# Patient Record
Sex: FEMALE | Race: ASIAN | URBAN - METROPOLITAN AREA
[De-identification: names, ages, dates, MRNs, and addresses within clinical notes are randomized per-mention and may not be internally consistent; named-entity substitution may affect disease eponyms.]

---

## 2017-01-04 DIAGNOSIS — I10 ESSENTIAL HYPERTENSION, BENIGN: Primary | ICD-10-CM

## 2017-01-04 RX ORDER — NEBIVOLOL 2.5 MG/1
2.5 TABLET ORAL DAILY
Qty: 90 TABLET | Refills: 3 | Status: SHIPPED | OUTPATIENT
Start: 2017-01-04 | End: 2018-01-24

## 2017-01-04 RX ORDER — TELMISARTAN AND AMLODIPINE 5; 40 MG/1; MG/1
1 TABLET ORAL DAILY
Qty: 90 TABLET | Refills: 3 | Status: SHIPPED | OUTPATIENT
Start: 2017-01-04 | End: 2018-01-24

## 2017-01-09 ENCOUNTER — OFFICE VISIT (OUTPATIENT)
Dept: CARDIOLOGY | Facility: CLINIC | Age: 72
End: 2017-01-09
Attending: INTERNAL MEDICINE
Payer: COMMERCIAL

## 2017-01-09 VITALS
WEIGHT: 112 LBS | HEART RATE: 78 BPM | SYSTOLIC BLOOD PRESSURE: 124 MMHG | HEIGHT: 60 IN | DIASTOLIC BLOOD PRESSURE: 68 MMHG | BODY MASS INDEX: 21.99 KG/M2

## 2017-01-09 DIAGNOSIS — I25.83 CORONARY ATHEROSCLEROSIS DUE TO LIPID RICH PLAQUE: ICD-10-CM

## 2017-01-09 DIAGNOSIS — E78.2 MIXED HYPERLIPIDEMIA: Primary | ICD-10-CM

## 2017-01-09 PROCEDURE — 99213 OFFICE O/P EST LOW 20 MIN: CPT | Performed by: INTERNAL MEDICINE

## 2017-01-09 RX ORDER — ISOSORBIDE MONONITRATE 30 MG/1
15 TABLET, EXTENDED RELEASE ORAL DAILY
Qty: 45 TABLET | Refills: 3 | Status: SHIPPED | OUTPATIENT
Start: 2017-01-09 | End: 2018-01-24

## 2017-01-09 RX ORDER — ROSUVASTATIN CALCIUM 20 MG/1
20 TABLET, COATED ORAL DAILY
Qty: 90 TABLET | Refills: 3 | Status: SHIPPED | OUTPATIENT
Start: 2017-01-09 | End: 2018-01-24

## 2017-01-09 NOTE — Clinical Note
"1/9/2017    Tracey Bell MD  [facility]   [address] [facility] [address]    RE: Afl Hargrove       Dear Colleague,    I had the pleasure of seeing Alf Hargrove in the [facility].    Cardiology Progress Note          Assessment and Plan:     1. Coronary artery disease status post PCI in [place]    Angina stable on a small dose of Imdur    Continue statin    Routine follow-up in one year      This note was transcribed using electronic voice recognition software and there may be typographical errors present.                Interval History:   The patient is a very pleasant 71 year old Mongolian woman whom I have been following for coronary artery disease, the symptoms have improved quite a bit on a very small dose of Imdur. She denies any exertional chest discomfort or dyspnea on exertion.  She feels very well.  She has aortic sclerosis without significant stenosis, her murmur is unchanged and still remains early peaking.                     Review of Systems:   Review of Systems:  Skin:  not assessed     Eyes:  Negative    ENT:  Negative    Respiratory:  Positive for cough  Cardiovascular:  Negative    Gastroenterology: Negative    Genitourinary:  not assessed    Musculoskeletal:  Negative    Neurologic:  Negative    Psychiatric:  Negative    Heme/Lymph/Imm:  Negative    Endocrine:  Negative                Physical Exam:     Vitals: /68 mmHg  Pulse 78  Ht 1.511 m (4' 11.5\")  Wt 50.803 kg (112 lb)  BMI 22.25 kg/m2  Constitutional:  cooperative, alert and oriented, well developed, well nourished, in no acute distress        Skin:  warm and dry to the touch, no apparent skin lesions or masses noted        Head:  normocephalic, no masses or lesions        Eyes:  pupils equal and round, conjunctivae and lids unremarkable, sclera white, no xanthalasma, EOMS intact, no nystagmus        ENT:  no pallor or cyanosis        Neck:  JVP normal        Chest:  normal " breath sounds, clear to auscultation, normal A-P diameter, normal symmetry, normal respiratory excursion, no use of accessory muscles        Cardiac:         early systolic murmur;RUSB;grade 2          Abdomen:      benign    Vascular:                                        Extremities and Back:  no deformities, clubbing, cyanosis, erythema observed;no edema        Neurological:  affect appropriate, oriented to time, person and place;no gross motor deficits                 Medications:     Current Outpatient Prescriptions   Medication Sig Dispense Refill     isosorbide mononitrate (IMDUR) 30 MG 24 hr tablet Take 0.5 tablets (15 mg) by mouth daily 45 tablet 3     rosuvastatin (CRESTOR) 20 MG tablet Take 1 tablet (20 mg) by mouth daily 90 tablet 3     Telmisartan-Amlodipine (TWYNSTA) 40-5 MG TABS Take 1 tablet by mouth daily 90 tablet 3     nebivolol (BYSTOLIC) 2.5 MG tablet Take 1 tablet (2.5 mg) by mouth daily 90 tablet 3     Cholecalciferol (VITAMIN D3 PO) Take 5,000 Units by mouth every other day       VITAMIN E COMPLEX PO Take 400 Units by mouth daily       nitroglycerin (NITROSTAT) 0.4 MG SL tablet Place one tab under the tongue for chest pain, may repeat every 5 minutes x2 25 tablet 3     aspirin 81 MG tablet Take 1 tablet (81 mg) by mouth daily 30 tablet      [DISCONTINUED] isosorbide mononitrate (IMDUR) 30 MG 24 hr tablet Take 0.5 tablets (15 mg) by mouth daily 15 tablet 12     [DISCONTINUED] rosuvastatin (CRESTOR) 20 MG tablet Take 1 tablet (20 mg) by mouth daily 30 tablet 12                Data:   All laboratory data reviewed  No results found for this or any previous visit (from the past 24 hour(s)).    All laboratory data reviewed  CHOL      178   7/11/2016  HDL       54   7/11/2016  LDL       79   7/11/2016  TRIG      223   7/11/2016  CHOLHDLRATIO      3.5   11/26/2014  TSH   Date Value Ref Range Status   04/12/2016 2.41 0.40 - 4.00 mU/L Final     Last Basic Metabolic Panel:  NA      142   4/12/2016    POTASSIUM      4.0   4/12/2016  CHLORIDE      107   4/12/2016  RICO      9.0   4/12/2016  CO2       25   4/12/2016  BUN       12   4/12/2016  BUN   NOT APPLICABLE   9/13/2008  CR     0.61   4/12/2016  GLC      109   4/12/2016  GLC       96   9/13/2008  WBC      6.7   4/12/2016  RBC     4.31   4/12/2016  HGB     13.1   4/12/2016  HCT     40.2   4/12/2016  MCV       93   4/12/2016  MCH     30.4   4/12/2016  MCHC     32.6   4/12/2016  RDW     12.4   4/12/2016  PLT      267   4/12/2016  PLT      395   3/27/2007    Thank you for allowing me to participate in the care of your patient.    Sincerely,     Rad Malcolm MD     [facility]

## 2017-01-09 NOTE — PROGRESS NOTES
"Cardiology Progress Note          Assessment and Plan:     1. Coronary artery disease status post PCI in [place]    Angina stable on a small dose of Imdur    Continue statin    Routine follow-up in one year      This note was transcribed using electronic voice recognition software and there may be typographical errors present.                Interval History:   The patient is a very pleasant 71 year old Hebrew woman whom I have been following for coronary artery disease, the symptoms have improved quite a bit on a very small dose of Imdur. She denies any exertional chest discomfort or dyspnea on exertion.  She feels very well.  She has aortic sclerosis without significant stenosis, her murmur is unchanged and still remains early peaking.                     Review of Systems:   Review of Systems:  Skin:  not assessed     Eyes:  Negative    ENT:  Negative    Respiratory:  Positive for cough  Cardiovascular:  Negative    Gastroenterology: Negative    Genitourinary:  not assessed    Musculoskeletal:  Negative    Neurologic:  Negative    Psychiatric:  Negative    Heme/Lymph/Imm:  Negative    Endocrine:  Negative                Physical Exam:     Vitals: /68 mmHg  Pulse 78  Ht 1.511 m (4' 11.5\")  Wt 50.803 kg (112 lb)  BMI 22.25 kg/m2  Constitutional:  cooperative, alert and oriented, well developed, well nourished, in no acute distress        Skin:  warm and dry to the touch, no apparent skin lesions or masses noted        Head:  normocephalic, no masses or lesions        Eyes:  pupils equal and round, conjunctivae and lids unremarkable, sclera white, no xanthalasma, EOMS intact, no nystagmus        ENT:  no pallor or cyanosis        Neck:  JVP normal        Chest:  normal breath sounds, clear to auscultation, normal A-P diameter, normal symmetry, normal respiratory excursion, no use of accessory muscles        Cardiac:         early systolic murmur;RUSB;grade 2          Abdomen:      benign    Vascular:         "                                Extremities and Back:  no deformities, clubbing, cyanosis, erythema observed;no edema        Neurological:  affect appropriate, oriented to time, person and place;no gross motor deficits                 Medications:     Current Outpatient Prescriptions   Medication Sig Dispense Refill     isosorbide mononitrate (IMDUR) 30 MG 24 hr tablet Take 0.5 tablets (15 mg) by mouth daily 45 tablet 3     rosuvastatin (CRESTOR) 20 MG tablet Take 1 tablet (20 mg) by mouth daily 90 tablet 3     Telmisartan-Amlodipine (TWYNSTA) 40-5 MG TABS Take 1 tablet by mouth daily 90 tablet 3     nebivolol (BYSTOLIC) 2.5 MG tablet Take 1 tablet (2.5 mg) by mouth daily 90 tablet 3     Cholecalciferol (VITAMIN D3 PO) Take 5,000 Units by mouth every other day       VITAMIN E COMPLEX PO Take 400 Units by mouth daily       nitroglycerin (NITROSTAT) 0.4 MG SL tablet Place one tab under the tongue for chest pain, may repeat every 5 minutes x2 25 tablet 3     aspirin 81 MG tablet Take 1 tablet (81 mg) by mouth daily 30 tablet      [DISCONTINUED] isosorbide mononitrate (IMDUR) 30 MG 24 hr tablet Take 0.5 tablets (15 mg) by mouth daily 15 tablet 12     [DISCONTINUED] rosuvastatin (CRESTOR) 20 MG tablet Take 1 tablet (20 mg) by mouth daily 30 tablet 12                Data:   All laboratory data reviewed  No results found for this or any previous visit (from the past 24 hour(s)).    All laboratory data reviewed  CHOL      178   7/11/2016  HDL       54   7/11/2016  LDL       79   7/11/2016  TRIG      223   7/11/2016  CHOLHDLRATIO      3.5   11/26/2014  TSH   Date Value Ref Range Status   04/12/2016 2.41 0.40 - 4.00 mU/L Final     Last Basic Metabolic Panel:  NA      142   4/12/2016   POTASSIUM      4.0   4/12/2016  CHLORIDE      107   4/12/2016  RICO      9.0   4/12/2016  CO2       25   4/12/2016  BUN       12   4/12/2016  BUN   NOT APPLICABLE   9/13/2008  CR     0.61   4/12/2016  GLC      109   4/12/2016  GLC       96    9/13/2008  WBC      6.7   4/12/2016  RBC     4.31   4/12/2016  HGB     13.1   4/12/2016  HCT     40.2   4/12/2016  MCV       93   4/12/2016  MCH     30.4   4/12/2016  MCHC     32.6   4/12/2016  RDW     12.4   4/12/2016  PLT      267   4/12/2016  PLT      395   3/27/2007

## 2017-04-11 ENCOUNTER — CARE COORDINATION (OUTPATIENT)
Dept: GERIATRIC MEDICINE | Facility: CLINIC | Age: 72
End: 2017-04-11

## 2017-04-11 NOTE — PROGRESS NOTES
Called client with Armenian phone .  Requested return call to complete six month telephone assessment.  RENA Bolton, [facility]   Tel [phone]  Fax [phone]

## 2017-04-12 NOTE — PROGRESS NOTES
Left message on 's cell phone with Irish phone  requesting return call to complete six month telephone assessment.  RENA Bolton, [facility]   Tel [phone]  Fax [phone]

## 2017-04-17 NOTE — PROGRESS NOTES
[facility] Six-Month Telephone Assessment    6 month telephone assessment completed on 4/17/17.    ER visits: No  Hospitalizations: No  TCU stays: No  Significant health status changes: no change.  Client doing well.  Falls/Injuries: No  ADL/IADL changes: No  Changes in services: No    Goals: See POC in chart for goal progress documentation.     Caregiver Assessment follow up:  na    Will see client in 6 months for an annual health risk assessment.   Encouraged client to call CM with any questions or concerns in the meantime.     RENA Bolton, [facility]   Tel [phone]  Fax [phone]

## 2017-04-21 ENCOUNTER — TELEPHONE (OUTPATIENT)
Dept: CARDIOLOGY | Facility: CLINIC | Age: 72
End: 2017-04-21

## 2017-05-01 NOTE — TELEPHONE ENCOUNTER
Pts son BALAJI called wondering what the status was for the TWYNSTA as the patient has been out of the medication for a couple weeks now. Pt stated he spoke with the pharmacy and they stated they have not heard anything from us for a PA. Writer informed patient she would check in with the PA team to see if we can get this figured out.     Will route to PA team.

## 2017-05-01 NOTE — TELEPHONE ENCOUNTER
[facility] Prior Authorization Team   Phone: [phone]  Fax: [phone]    PA Initiation    Medication: TWYNSTA 40-5MG  Insurance Company: [employer] - Phone [phone] Fax [phone]  Pharmacy Filling the Rx: [facility] [place], [place] - [facility] [address]  Filling Pharmacy Phone: [phone]  Filling Pharmacy Fax:    Start Date: 5/1/2017

## 2017-05-02 NOTE — TELEPHONE ENCOUNTER
Prior Authorization Approval    Authorization Effective Date: 1/31/2017  Authorization Expiration Date: 5/1/2018  Medication: TWYNSTA 40-5MG - APPROVED  Approved Dose/Quantity:   Reference #:     Insurance Company: [email or web address] - Phone [phone] Fax [phone]  Expected CoPay: $1.20     CoPay Card Available:      Foundation Assistance Needed:    Which Pharmacy is filling the prescription (Not needed for infusion/clinic administered): [facility] [place], [place] - [facility] [address]  Pharmacy Notified: Yes  Patient Notified: YesComment:  LEFT VOICE MESSAGE

## 2017-05-10 NOTE — TELEPHONE ENCOUNTER
Triage Call from son BALAJI inquiring if Prior Authorization (PA) was approved as patient is running out of medication soon.  Reviewed PA was approved and is in chart from 5/2/2017 - approved through 5/1/2018 and sent to pharmacy.  Called to [facility] to assist son, they confirm they did receive PA for TWYNSTA and was approved and have available for pickup. Son informed.  Steph Aquino RN

## 2017-09-22 ENCOUNTER — CARE COORDINATION (OUTPATIENT)
Dept: GERIATRIC MEDICINE | Facility: CLINIC | Age: 72
End: 2017-09-22

## 2017-09-22 NOTE — PROGRESS NOTES
Called client with Romansh phone  and left message requesting return call to schedule annual home visit.  RENA Bolton, [facility]   Tel [phone]  Fax [phone]

## 2017-09-27 NOTE — PROGRESS NOTES
Called client again with Divehi phone  and left message requesting return call on home phone number.  Also called cell number but did not allow CM to leave a message.  Called son Errol Hawthorne at number CM had in chart and also same number in EPIC but reached a voice mail that was not his.  RENA Bolton, [facility]   Tel [phone]  Fax [phone]

## 2017-09-28 NOTE — PROGRESS NOTES
Called home number of son and spoke with his wife. She gave CM his cell phone number at [phone].  Called son and he states that parents are out of town until the end of October.  He will see them next week and will check with them about scheduling a visit for early November and will call CM back.  RENA Bolton, [facility]   Tel [phone]  Fax [phone]

## 2017-11-02 NOTE — PROGRESS NOTES
Tried to reach client again with Chinese phone  but no answer.   left message requesting return call.  RENA Bolton, [facility]   Tel [phone]  Fax [phone]

## 2017-11-08 NOTE — PROGRESS NOTES
Received voice mail message from  Kanika Hargrove.  Client asked her to set up annual assessment visit.  Returned call and scheduled for 12:30 pm on November 13th.  Called [facility] to put it in Kanika's schedule.  Kanika will let client know date and time.  RENA Bolton, [facility]   Tel [phone]  Fax [phone]

## 2017-11-13 ENCOUNTER — CARE COORDINATION (OUTPATIENT)
Dept: GERIATRIC MEDICINE | Facility: CLINIC | Age: 72
End: 2017-11-13

## 2017-11-14 ENCOUNTER — DOCUMENTATION ONLY (OUTPATIENT)
Dept: OTHER | Facility: CLINIC | Age: 72
End: 2017-11-14

## 2017-11-14 DIAGNOSIS — Z71.89 ADVANCE CARE PLANNING: Chronic | ICD-10-CM

## 2017-11-14 NOTE — PROGRESS NOTES
[facility] Home Visit Assessment     Home visit for Health Risk Assessment with Alf Hargrove completed on November 13, 2017  Member resides in Private home with stairs and lives with spouse  Present at assessment: member, this care coordinator,  and spouse Aaliyah Hawthorne.    Current Care Plan  Member currently receiving the following services: None      Medication Review  Medication reconciliation completed in Epic:Yes  Medication set-up & administration: Independent and sets up on own weekly.  Self-administers medications.  Medication understanding/adherence (by member): Member has no questions about  her medications    Mental/Behavioral Health   Depression Screening: See PHQ assessment flowsheet.   Mental Health Diagnosis: No If yes, how managed?  NA  No current MH services-will place referral for NA    Falls in last 12 months: No If yes, was an injury sustained? No    ADL/IADL Dependencies: None     [facility] sponsored benefits: Shared information re: Silver Sneakers/gym memberships, ASA, Calcium +D.    PCA Assessment completed at visit: Yes   If yes, will process assessment and communicate results to member within 10 days.  Elderly Waiver Eligibility: No-does not meet criteria    Care Plan & Recommendations: Client is independent.  She and  go to [facility] with [employer] and [employer] program.  Discussed and completed HCD (short form) at visit. Client does not want to make specific choices for health care should she not be able to participate. She states that her son would be the decision maker and knows what she wants.    See [facility] for detailed assessment information.    Follow-Up Plan: Member informed of future contact, plan to f/u with member with a 6 month telephone assessment.  Contact information shared with member and family, encouraged member to call with any questions or concerns at any time.  [facility] providers: Please refer to Health Care Home on the [facility] Problem List to view  this patient's [facility] Care Plan Summary.    RENA Bolton, [facility]   Tel [phone]  Fax [phone]

## 2017-11-15 ENCOUNTER — CARE COORDINATION (OUTPATIENT)
Dept: GERIATRIC MEDICINE | Facility: CLINIC | Age: 72
End: 2017-11-15

## 2017-11-15 NOTE — PROGRESS NOTES
Received after visit chart from care coordinator.  Completed following tasks: Mailed copy of care plan to client  Entered MMIS  Faxed JILL to [facility]  Chart was returned to CC.     Candi Bear  Case Management Specialist  [facility]   [phone]

## 2017-11-24 ENCOUNTER — RADIANT APPOINTMENT (OUTPATIENT)
Dept: GENERAL RADIOLOGY | Facility: CLINIC | Age: 72
End: 2017-11-24
Attending: INTERNAL MEDICINE
Payer: COMMERCIAL

## 2017-11-24 ENCOUNTER — OFFICE VISIT (OUTPATIENT)
Dept: INTERNAL MEDICINE | Facility: CLINIC | Age: 72
End: 2017-11-24
Payer: COMMERCIAL

## 2017-11-24 VITALS
SYSTOLIC BLOOD PRESSURE: 110 MMHG | BODY MASS INDEX: 22.58 KG/M2 | DIASTOLIC BLOOD PRESSURE: 70 MMHG | OXYGEN SATURATION: 95 % | HEART RATE: 79 BPM | HEIGHT: 60 IN | TEMPERATURE: 98.1 F | WEIGHT: 115 LBS

## 2017-11-24 DIAGNOSIS — Z12.11 SCREEN FOR COLON CANCER: ICD-10-CM

## 2017-11-24 DIAGNOSIS — I10 BENIGN ESSENTIAL HYPERTENSION: ICD-10-CM

## 2017-11-24 DIAGNOSIS — M79.645 THUMB PAIN, LEFT: ICD-10-CM

## 2017-11-24 DIAGNOSIS — M79.645 THUMB PAIN, LEFT: Primary | ICD-10-CM

## 2017-11-24 DIAGNOSIS — E78.5 HYPERLIPIDEMIA LDL GOAL <70: ICD-10-CM

## 2017-11-24 PROCEDURE — 73140 X-RAY EXAM OF FINGER(S): CPT | Mod: LT

## 2017-11-24 PROCEDURE — 82043 UR ALBUMIN QUANTITATIVE: CPT | Performed by: INTERNAL MEDICINE

## 2017-11-24 PROCEDURE — 80053 COMPREHEN METABOLIC PANEL: CPT | Performed by: INTERNAL MEDICINE

## 2017-11-24 PROCEDURE — 99214 OFFICE O/P EST MOD 30 MIN: CPT | Performed by: INTERNAL MEDICINE

## 2017-11-24 PROCEDURE — 36415 COLL VENOUS BLD VENIPUNCTURE: CPT | Performed by: INTERNAL MEDICINE

## 2017-11-24 NOTE — PROGRESS NOTES
"  SUBJECTIVE:   Alf Hargrove is a 71 year old female who presents to clinic today for the following health issues:     is present.     Hypertension   Patient is followed by cardiology.      Outpatient blood pressures are not being checked.    Low Salt Diet: low salt    Hyperlipidemia.  No muscle aches to the cholesterol medication.  Patient is not fasting today for the labs.     Left thumb pain.  She has had left thumb pain for 2-3 months.  Left thumb hurts throughout the day.  No swelling.  No other joint that bother her.   Denies any injury.  Denies any numbness or tingling.  The patient denies any weakness of the thumb.   No arthritis in her family.   No change in medication.         Amount of exercise or physical activity: minimal    Problems taking medications regularly: No    Medication side effects: none    Diet: regular (no restrictions)          Problem list and histories reviewed & adjusted, as indicated.    Reviewed and updated as needed this visit by clinical staffTobacco  Allergies  Meds  Med Hx  Surg Hx  Fam Hx  Soc Hx      Reviewed and updated as needed this visit by Provider         ROS:  C: NEGATIVE for fever, chills, change in weight  E/M: NEGATIVE for ear, mouth and throat problems  R: NEGATIVE for significant cough or SOB  CV: NEGATIVE for chest pain, palpitations or peripheral edema    OBJECTIVE:     /70 (BP Location: Left arm, Cuff Size: Adult Regular)  Pulse 79  Temp 98.1  F (36.7  C) (Oral)  Ht 4' 11.5\" (1.511 m)  Wt 115 lb (52.2 kg)  SpO2 95%  Breastfeeding? No  BMI 22.84 kg/m2  Body mass index is 22.84 kg/(m^2).  GENERAL: healthy, alert and no distress  NECK: no adenopathy, no asymmetry, masses, or scars and thyroid normal to palpation  RESP: lungs clear to auscultation - no rales, rhonchi or wheezes  CV: regular rate and rhythm, normal S1 S2, no S3 or S4; 2/6 ROHIT  MSK: left thumb with pain upon palpation; ROM intact; no synovitis " appreciated    ASSESSMENT/PLAN:       (M79.075) Thumb pain, left  (primary encounter diagnosis)  Comment: assess for arthritis  Plan: XR Finger Left G/E 2 Views, ORTHO          REFERRAL            (I10) Benign essential hypertension  Comment: at goal  Plan: Comprehensive metabolic panel, Albumin Random         Urine Quantitative with Creat Ratio            (E78.5) Hyperlipidemia LDL goal <70  Comment:   Plan: Comprehensive metabolic panel            (Z12.11) Screen for colon cancer  Comment:   Plan: Fecal colorectal cancer screen (FIT)            Declines mammogram and bone density        Tracey Bell MD  [facility]

## 2017-11-24 NOTE — MR AVS SNAPSHOT
After Visit Summary   11/24/2017    Alf Hargrove    MRN: [id number]           Patient Information     Date Of Birth          1945        Visit Information        Provider Department      11/24/2017 10:30 AM Tracey Bell MD; [employer] [facility]        Today's Diagnoses     Thumb pain, left    -  1    Benign essential hypertension        Hyperlipidemia LDL goal <70        Screen for colon cancer          Care Instructions    Xray today    Try tylenol or glucosamine chondroitin or fish oil          Follow-ups after your visit        Additional Services     ORTHO  REFERRAL       [facility] is referring you to the Orthopedic  Services at [facility].       The  Representative will assist you in the coordination of your Orthopedic and Musculoskeletal Care as prescribed by your physician.    The  Representative will call you within 1 business day to help schedule your appointment, or you may contact the  Representative at:    All areas ~ [phone]     Type of Referral : Non Surgical       Timeframe requested: Routine    Coverage of these services is subject to the terms and limitations of your health insurance plan.  Please call member services at your health plan with any benefit or coverage questions.      If X-rays, CT or MRI's have been performed, please contact the facility where they were done to arrange for , prior to your scheduled appointment.  Please bring this referral request to your appointment and present it to your specialist.                  Future tests that were ordered for you today     Open Future Orders        Priority Expected Expires Ordered    XR Finger Left G/E 2 Views Routine 11/24/2017 11/24/2018 11/24/2017    Fecal colorectal cancer screen (FIT) Routine 12/15/2017 2/16/2018 11/24/2017            Who to contact     If you have questions  "or need follow up information about today's clinic visit or your schedule please contact [facility] directly at [phone].  Normal or non-critical lab and imaging results will be communicated to you by MyChart, letter or phone within 4 business days after the clinic has received the results. If you do not hear from us within 7 days, please contact the clinic through [employer] or phone. If you have a critical or abnormal lab result, we will notify you by phone as soon as possible.  Submit refill requests through [employer] or call your pharmacy and they will forward the refill request to us. Please allow 3 business days for your refill to be completed.          Additional Information About Your Visit        [employer] Information     [employer] gives you secure access to your electronic health record. If you see a primary care provider, you can also send messages to your care team and make appointments. If you have questions, please call your primary care clinic.  If you do not have a primary care provider, please call [phone] and they will assist you.        Care EveryWhere ID     This is your Care EveryWhere ID. This could be used by other organizations to access your [place] medical records  [id number]        Your Vitals Were     Pulse Temperature Height Pulse Oximetry Breastfeeding? BMI (Body Mass Index)    79 98.1  F (36.7  C) (Oral) 4' 11.5\" (1.511 m) 95% No 22.84 kg/m2       Blood Pressure from Last 3 Encounters:   11/24/17 110/70   01/09/17 124/68   07/11/16 134/82    Weight from Last 3 Encounters:   11/24/17 115 lb (52.2 kg)   01/09/17 112 lb (50.8 kg)   07/11/16 111 lb (50.3 kg)              We Performed the Following     Albumin Random Urine Quantitative with Creat Ratio     Comprehensive metabolic panel     ORTHO [facility] REFERRAL        Primary Care Provider Office Phone # Fax #    Tracey Bell [phone]       [address]  [facility] [address]      "   Equal Access to Services     [facility] : Hadii janessa chung chrisneeta Somichaelali, waaxda luqadaha, qaybta kaalmada yordydestinisosa, jaz dinh. So [facility] [phone].    ATENCIÓN: Si habla español, tiene a barber disposición servicios gratuitos de asistencia lingüística. Ольгаame al [phone].    We comply with applicable federal civil rights laws and [place] laws. We do not discriminate on the basis of race, color, national origin, age, disability, sex, sexual orientation, or gender identity.            Thank you!     Thank you for choosing [facility]  for your care. Our goal is always to provide you with excellent care. Hearing back from our patients is one way we can continue to improve our services. Please take a few minutes to complete the written survey that you may receive in the mail after your visit with us. Thank you!             Your Updated Medication List - Protect others around you: Learn how to safely use, store and throw away your medicines at www.disposemymeds.org.          This list is accurate as of: 11/24/17 11:20 AM.  Always use your most recent med list.                   Brand Name Dispense Instructions for use Diagnosis    aspirin 81 MG tablet     30 tablet    Take 1 tablet (81 mg) by mouth daily        isosorbide mononitrate 30 MG 24 hr tablet    IMDUR    45 tablet    Take 0.5 tablets (15 mg) by mouth daily    Coronary atherosclerosis due to lipid rich plaque       nebivolol 2.5 MG tablet    BYSTOLIC    90 tablet    Take 1 tablet (2.5 mg) by mouth daily    Essential hypertension, benign       nitroGLYcerin 0.4 MG sublingual tablet    NITROSTAT    25 tablet    Place one tab under the tongue for chest pain, may repeat every 5 minutes x2    CAD (coronary artery disease)       rosuvastatin 20 MG tablet    CRESTOR    90 tablet    Take 1 tablet (20 mg) by mouth daily    Mixed hyperlipidemia       Telmisartan-Amlodipine 40-5 MG Tabs    TWYNSTA    90 tablet    Take  1 tablet by mouth daily    Essential hypertension, benign       VITAMIN D3 PO      Take 5,000 Units by mouth every other day        VITAMIN E COMPLEX PO      Take 400 Units by mouth daily

## 2017-11-24 NOTE — NURSING NOTE
"Chief Complaint   Patient presents with     Thumb Discomfort     left thumb pain for 2-3 months       Initial /70 (BP Location: Left arm, Cuff Size: Adult Regular)  Pulse 79  Temp 98.1  F (36.7  C) (Oral)  Ht 4' 11.5\" (1.511 m)  Wt 115 lb (52.2 kg)  SpO2 95%  Breastfeeding? No  BMI 22.84 kg/m2 Estimated body mass index is 22.84 kg/(m^2) as calculated from the following:    Height as of this encounter: 4' 11.5\" (1.511 m).    Weight as of this encounter: 115 lb (52.2 kg).  Medication Reconciliation: complete    "

## 2017-11-25 LAB
ALBUMIN SERPL-MCNC: 3.8 G/DL (ref 3.4–5)
ALP SERPL-CCNC: 118 U/L (ref 40–150)
ALT SERPL W P-5'-P-CCNC: 31 U/L (ref 0–50)
ANION GAP SERPL CALCULATED.3IONS-SCNC: 7 MMOL/L (ref 3–14)
AST SERPL W P-5'-P-CCNC: 24 U/L (ref 0–45)
BILIRUB SERPL-MCNC: 0.4 MG/DL (ref 0.2–1.3)
BUN SERPL-MCNC: 14 MG/DL (ref 7–30)
CALCIUM SERPL-MCNC: 8.7 MG/DL (ref 8.5–10.1)
CHLORIDE SERPL-SCNC: 111 MMOL/L (ref 94–109)
CO2 SERPL-SCNC: 27 MMOL/L (ref 20–32)
CREAT SERPL-MCNC: 0.62 MG/DL (ref 0.52–1.04)
CREAT UR-MCNC: 97 MG/DL
GFR SERPL CREATININE-BSD FRML MDRD: >90 ML/MIN/1.7M2
GLUCOSE SERPL-MCNC: 106 MG/DL (ref 70–99)
MICROALBUMIN UR-MCNC: 12 MG/L
MICROALBUMIN/CREAT UR: 12.77 MG/G CR (ref 0–25)
POTASSIUM SERPL-SCNC: 4 MMOL/L (ref 3.4–5.3)
PROT SERPL-MCNC: 7.4 G/DL (ref 6.8–8.8)
SODIUM SERPL-SCNC: 145 MMOL/L (ref 133–144)

## 2017-11-27 PROCEDURE — 82274 ASSAY TEST FOR BLOOD FECAL: CPT | Performed by: INTERNAL MEDICINE

## 2017-11-30 DIAGNOSIS — Z12.11 SCREEN FOR COLON CANCER: ICD-10-CM

## 2017-11-30 LAB — HEMOCCULT STL QL IA: NEGATIVE

## 2018-01-24 DIAGNOSIS — I10 ESSENTIAL HYPERTENSION, BENIGN: ICD-10-CM

## 2018-01-24 DIAGNOSIS — I25.83 CORONARY ATHEROSCLEROSIS DUE TO LIPID RICH PLAQUE: ICD-10-CM

## 2018-01-24 DIAGNOSIS — E78.2 MIXED HYPERLIPIDEMIA: ICD-10-CM

## 2018-01-24 RX ORDER — ROSUVASTATIN CALCIUM 20 MG/1
20 TABLET, COATED ORAL DAILY
Qty: 90 TABLET | Refills: 0 | Status: SHIPPED | OUTPATIENT
Start: 2018-01-24 | End: 2018-06-05

## 2018-01-24 RX ORDER — NEBIVOLOL 2.5 MG/1
2.5 TABLET ORAL DAILY
Qty: 90 TABLET | Refills: 0 | Status: SHIPPED | OUTPATIENT
Start: 2018-01-24 | End: 2018-06-05

## 2018-01-24 RX ORDER — ISOSORBIDE MONONITRATE 30 MG/1
15 TABLET, EXTENDED RELEASE ORAL DAILY
Qty: 45 TABLET | Refills: 0 | Status: SHIPPED | OUTPATIENT
Start: 2018-01-24 | End: 2018-04-18

## 2018-01-24 RX ORDER — TELMISARTAN AND AMLODIPINE 5; 40 MG/1; MG/1
1 TABLET ORAL DAILY
Qty: 90 TABLET | Refills: 0 | Status: SHIPPED | OUTPATIENT
Start: 2018-01-24 | End: 2018-06-05

## 2018-02-15 ENCOUNTER — TELEPHONE (OUTPATIENT)
Dept: INTERNAL MEDICINE | Facility: CLINIC | Age: 73
End: 2018-02-15

## 2018-02-15 NOTE — TELEPHONE ENCOUNTER
Panel Management Review      Patient has the following on her problem list:     Hypertension   Last three blood pressure readings:  BP Readings from Last 3 Encounters:   11/24/17 110/70   01/09/17 124/68   07/11/16 134/82     Blood pressure: Passed    HTN Guidelines:  Age 18-59 BP range:  Less than 140/90  Age 60-85 with Diabetes:  Less than 140/90  Age 60-85 without Diabetes:  less than 150/90      Composite cancer screening  Chart review shows that this patient is due/due soon for the following Mammogram  Summary:    Patient is due/failing the following:   MAMMOGRAM    Action needed:   Patient needs referral/order: Mammogram    Type of outreach:    Sent letter.    Questions for provider review:    None                                                                                                                                    Dhara Marshall CMA     Chart routed to no one .

## 2018-03-02 ENCOUNTER — TELEPHONE (OUTPATIENT)
Dept: INTERNAL MEDICINE | Facility: CLINIC | Age: 73
End: 2018-03-02

## 2018-03-02 DIAGNOSIS — H91.8X9 OTHER SPECIFIED FORMS OF HEARING LOSS, UNSPECIFIED LATERALITY: Primary | ICD-10-CM

## 2018-03-02 NOTE — TELEPHONE ENCOUNTER
Reason for Call: Request for an order or referral:    Order or referral being requested: HEARING TEST Q3 YR     Date needed: as soon as possible    Has the patient been seen by the PCP for this problem? YES    Additional comments: PATIENT LOST HEARING AID AND IS NEEDING AN ORDER FOR HEARING TEST SENT TO [facility] (3RD FLOOR HERE)    Phone number Patient can be reached at:  [phone] (-ALYSON)    Best Time:      Can we leave a detailed message on this number?  NO    Call taken on 3/2/2018 at 4:23 PM by Nel Ventura

## 2018-03-06 NOTE — TELEPHONE ENCOUNTER
Sent pt my chart message letting her know that PCP placed audiology referral and included contact info

## 2018-04-10 ENCOUNTER — PATIENT OUTREACH (OUTPATIENT)
Dept: GERIATRIC MEDICINE | Facility: CLINIC | Age: 73
End: 2018-04-10

## 2018-04-10 NOTE — PROGRESS NOTES
[facility] Care Coordination Contact  Called client to complete six month telephone assessment.  Left Message with [employer] phone  to request return call.  RENA Bolton, [facility]   Tel [phone]  Fax [phone]

## 2018-04-11 ENCOUNTER — PATIENT OUTREACH (OUTPATIENT)
Dept: GERIATRIC MEDICINE | Facility: CLINIC | Age: 73
End: 2018-04-11

## 2018-04-11 NOTE — PROGRESS NOTES
[facility] Care Coordination Contact    [facility] Six-Month Telephone Assessment    6 month telephone assessment completed on 4/11/18..    ER visits: No  Hospitalizations: No  TCU stays: No  Significant health status changes: no change  Falls/Injuries: No  ADL/IADL changes: No  Changes in services: No  Client asked about getting a shingles vaccination.  Told her it is covered and recommended she get it at the pharmacy.     Caregiver Assessment follow up:  na    Goals: See POC in chart for goal progress documentation.      Will see member in 6 months for an annual health risk assessment.   Encouraged member to call CC with any questions or concerns in the meantime.   RENA Bloton, CCM  [facility]   Tel [phone]  Fax [phone]

## 2018-04-18 ENCOUNTER — OFFICE VISIT (OUTPATIENT)
Dept: CARDIOLOGY | Facility: CLINIC | Age: 73
End: 2018-04-18
Payer: COMMERCIAL

## 2018-04-18 VITALS
WEIGHT: 116.7 LBS | HEIGHT: 60 IN | HEART RATE: 80 BPM | DIASTOLIC BLOOD PRESSURE: 72 MMHG | SYSTOLIC BLOOD PRESSURE: 128 MMHG | BODY MASS INDEX: 22.91 KG/M2

## 2018-04-18 DIAGNOSIS — I25.83 CORONARY ATHEROSCLEROSIS DUE TO LIPID RICH PLAQUE: ICD-10-CM

## 2018-04-18 DIAGNOSIS — I25.10 CORONARY ARTERY DISEASE INVOLVING NATIVE CORONARY ARTERY OF NATIVE HEART WITHOUT ANGINA PECTORIS: ICD-10-CM

## 2018-04-18 PROCEDURE — 99214 OFFICE O/P EST MOD 30 MIN: CPT | Performed by: INTERNAL MEDICINE

## 2018-04-18 RX ORDER — ISOSORBIDE MONONITRATE 30 MG/1
30 TABLET, EXTENDED RELEASE ORAL DAILY
Qty: 90 TABLET | Refills: 3 | Status: SHIPPED | OUTPATIENT
Start: 2018-04-18 | End: 2019-06-20

## 2018-04-18 RX ORDER — GLUCOSAMINE SULFATE 500 MG
CAPSULE ORAL
COMMUNITY

## 2018-04-18 RX ORDER — CHLORAL HYDRATE 500 MG
2 CAPSULE ORAL DAILY
COMMUNITY

## 2018-04-18 RX ORDER — NITROGLYCERIN 0.4 MG/1
TABLET SUBLINGUAL
Qty: 25 TABLET | Refills: 3 | Status: SHIPPED | OUTPATIENT
Start: 2018-04-18

## 2018-04-18 RX ORDER — OMEGA-3 FATTY ACIDS/FISH OIL 300-1000MG
2000 CAPSULE ORAL 2 TIMES DAILY
Qty: 360 CAPSULE | Refills: 3 | Status: SHIPPED | OUTPATIENT
Start: 2018-04-18 | End: 2018-09-27

## 2018-04-18 NOTE — LETTER
4/18/2018    Tracey Bell MD  [address] [facility] [address]    RE: Alf Hargrove       Dear Colleague,    I had the pleasure of seeing Alf Beltran Delvin in the [facility].    Cardiology Progress Note          Assessment and Plan:     1. Coronary artery disease with stable angina and mild ischemic cardiomyopathy status post multivessel PCI.    Discussed that if patient has escalation of symptoms, would want either repeat coronary angiogram or stress testing.    Plan increase Imdur to 30 mg daily and further ischemic evaluation if symptoms escalate.      2. Trivial aortic stenosis on echo 2015, mean gradient 9 mmHg.  No delayed carotid upstroke on physical exam today.    Recheck echo in 1 year, clinical follow-up at that time.    This note was transcribed using electronic voice recognition software and there may be typographical errors present.                Interval History:   The patient is a very pleasant 72 year old whom I have been following for multivessel coronary artery disease status post PCI in 2007 and 2009 in [place].  She has mild ischemic cardiomyopathy with ejection fraction around 45%.  She had exertional chest discomfort with significant improvement with 15 mg Imdur.  It does not bother her when doing normal activities, when she pushes fast, then she does get some limiting discomfort.  Overall, she feels her stamina is about the same.  Interview performed with help of .                     Review of Systems:   Review of Systems:  Skin:  Positive for itching   Eyes:  Negative    ENT:  Positive for hearing loss  Respiratory:  Negative    Cardiovascular:    chest pain;Positive for  Gastroenterology: Positive for excessive gas or bloating  Genitourinary:  not assessed    Musculoskeletal:  Positive for joint pain;back pain  Neurologic:  Negative    Psychiatric:  Positive for sleep disturbances  Heme/Lymph/Imm:  Negative    Endocrine:  Negative      "           Physical Exam:     Vitals: /72 (BP Location: Right arm, Patient Position: Sitting, Cuff Size: Adult Regular)  Pulse 80  Ht 1.511 m (4' 11.5\")  Wt 52.9 kg (116 lb 11.2 oz)  Breastfeeding? No  BMI 23.18 kg/m2  Constitutional:  cooperative, alert and oriented, well developed, well nourished, in no acute distress   NAD, alert     Skin:  warm and dry to the touch, no apparent skin lesions or masses noted   dry and warm    Head:  normocephalic, no masses or lesions   atraumatic    Eyes:  pupils equal and round, conjunctivae and lids unremarkable, sclera white, no xanthalasma, EOMS intact, no nystagmus   EOMI    ENT:  no pallor or cyanosis   speech normal, midline tongue    Neck:  JVP normal   supple    Chest:  normal breath sounds, clear to auscultation, normal A-P diameter, normal symmetry, normal respiratory excursion, no use of accessory muscles   clear to ascultation    Cardiac:         early systolic murmur;RUSB;grade 2          Abdomen:      benign    Vascular: pulses full and equal                                      Extremities and Back:  no deformities, clubbing, cyanosis, erythema observed;no edema        Neurological:  no gross motor deficits;affect appropriate                 Medications:     Current Outpatient Prescriptions   Medication Sig Dispense Refill     aspirin 81 MG tablet Take 1 tablet (81 mg) by mouth daily 30 tablet      Cholecalciferol (VITAMIN D3 PO) Take 5,000 Units by mouth every other day       fish oil-omega-3 fatty acids 1000 MG capsule Take 2 g by mouth daily       glucosamine 500 MG CAPS        isosorbide mononitrate (IMDUR) 30 MG 24 hr tablet Take 1 tablet (30 mg) by mouth daily 90 tablet 3     nebivolol (BYSTOLIC) 2.5 MG tablet Take 1 tablet (2.5 mg) by mouth daily 90 tablet 0     nitroGLYcerin (NITROSTAT) 0.4 MG sublingual tablet Place one tab under the tongue for chest pain, may repeat every 5 minutes x2 25 tablet 3     omega 3 1000 MG CAPS Take 2,000 mg by mouth " 2 times daily 360 capsule 3     rosuvastatin (CRESTOR) 20 MG tablet Take 1 tablet (20 mg) by mouth daily 90 tablet 0     Telmisartan-Amlodipine (TWYNSTA) 40-5 MG TABS Take 1 tablet by mouth daily 90 tablet 0     VITAMIN E COMPLEX PO Take 400 Units by mouth daily       [DISCONTINUED] isosorbide mononitrate (IMDUR) 30 MG 24 hr tablet Take 0.5 tablets (15 mg) by mouth daily 45 tablet 0     [DISCONTINUED] nitroglycerin (NITROSTAT) 0.4 MG SL tablet Place one tab under the tongue for chest pain, may repeat every 5 minutes x2 25 tablet 3                Data:   All laboratory data reviewed  No results found for this or any previous visit (from the past 24 hour(s)).    All laboratory data reviewed  Lab Results   Component Value Date    CHOL 178 07/11/2016     Lab Results   Component Value Date    HDL 54 07/11/2016     Lab Results   Component Value Date    LDL 79 07/11/2016     Lab Results   Component Value Date    TRIG 223 07/11/2016     Lab Results   Component Value Date    CHOLHDLRATIO 3.5 11/26/2014     TSH   Date Value Ref Range Status   04/12/2016 2.41 0.40 - 4.00 mU/L Final     Last Basic Metabolic Panel:  Lab Results   Component Value Date     11/24/2017      Lab Results   Component Value Date    POTASSIUM 4.0 11/24/2017     Lab Results   Component Value Date    CHLORIDE 111 11/24/2017     Lab Results   Component Value Date    RICO 8.7 11/24/2017     Lab Results   Component Value Date    CO2 27 11/24/2017     Lab Results   Component Value Date    BUN 14 11/24/2017     Lab Results   Component Value Date    CR 0.62 11/24/2017     Lab Results   Component Value Date     11/24/2017     Lab Results   Component Value Date    WBC 6.7 04/12/2016     Lab Results   Component Value Date    RBC 4.31 04/12/2016     Lab Results   Component Value Date    HGB 13.1 04/12/2016     Lab Results   Component Value Date    HCT 40.2 04/12/2016     Lab Results   Component Value Date    MCV 93 04/12/2016     Lab Results   Component  Value Date    MCH 30.4 04/12/2016     Lab Results   Component Value Date    MCHC 32.6 04/12/2016     Lab Results   Component Value Date    RDW 12.4 04/12/2016     Lab Results   Component Value Date     04/12/2016       Thank you for allowing me to participate in the care of your patient.    Sincerely,     Rad Malcolm MD     [facility]

## 2018-04-18 NOTE — PROGRESS NOTES
"Cardiology Progress Note          Assessment and Plan:     1. Coronary artery disease with stable angina and mild ischemic cardiomyopathy status post multivessel PCI.    Discussed that if patient has escalation of symptoms, would want either repeat coronary angiogram or stress testing.    Plan increase Imdur to 30 mg daily and further ischemic evaluation if symptoms escalate.      2. Trivial aortic stenosis on echo 2015, mean gradient 9 mmHg.  No delayed carotid upstroke on physical exam today.    Recheck echo in 1 year, clinical follow-up at that time.    This note was transcribed using electronic voice recognition software and there may be typographical errors present.                Interval History:   The patient is a very pleasant 72 year old whom I have been following for multivessel coronary artery disease status post PCI in 2007 and 2009 in [place].  She has mild ischemic cardiomyopathy with ejection fraction around 45%.  She had exertional chest discomfort with significant improvement with 15 mg Imdur.  It does not bother her when doing normal activities, when she pushes fast, then she does get some limiting discomfort.  Overall, she feels her stamina is about the same.  Interview performed with help of .                     Review of Systems:   Review of Systems:  Skin:  Positive for itching   Eyes:  Negative    ENT:  Positive for hearing loss  Respiratory:  Negative    Cardiovascular:    chest pain;Positive for  Gastroenterology: Positive for excessive gas or bloating  Genitourinary:  not assessed    Musculoskeletal:  Positive for joint pain;back pain  Neurologic:  Negative    Psychiatric:  Positive for sleep disturbances  Heme/Lymph/Imm:  Negative    Endocrine:  Negative                Physical Exam:     Vitals: /72 (BP Location: Right arm, Patient Position: Sitting, Cuff Size: Adult Regular)  Pulse 80  Ht 1.511 m (4' 11.5\")  Wt 52.9 kg (116 lb 11.2 oz)  Breastfeeding? No  BMI 23.18 " kg/m2  Constitutional:  cooperative, alert and oriented, well developed, well nourished, in no acute distress   NAD, alert     Skin:  warm and dry to the touch, no apparent skin lesions or masses noted   dry and warm    Head:  normocephalic, no masses or lesions   atraumatic    Eyes:  pupils equal and round, conjunctivae and lids unremarkable, sclera white, no xanthalasma, EOMS intact, no nystagmus   EOMI    ENT:  no pallor or cyanosis   speech normal, midline tongue    Neck:  JVP normal   supple    Chest:  normal breath sounds, clear to auscultation, normal A-P diameter, normal symmetry, normal respiratory excursion, no use of accessory muscles   clear to ascultation    Cardiac:         early systolic murmur;RUSB;grade 2          Abdomen:      benign    Vascular: pulses full and equal                                      Extremities and Back:  no deformities, clubbing, cyanosis, erythema observed;no edema        Neurological:  no gross motor deficits;affect appropriate                 Medications:     Current Outpatient Prescriptions   Medication Sig Dispense Refill     aspirin 81 MG tablet Take 1 tablet (81 mg) by mouth daily 30 tablet      Cholecalciferol (VITAMIN D3 PO) Take 5,000 Units by mouth every other day       fish oil-omega-3 fatty acids 1000 MG capsule Take 2 g by mouth daily       glucosamine 500 MG CAPS        isosorbide mononitrate (IMDUR) 30 MG 24 hr tablet Take 1 tablet (30 mg) by mouth daily 90 tablet 3     nebivolol (BYSTOLIC) 2.5 MG tablet Take 1 tablet (2.5 mg) by mouth daily 90 tablet 0     nitroGLYcerin (NITROSTAT) 0.4 MG sublingual tablet Place one tab under the tongue for chest pain, may repeat every 5 minutes x2 25 tablet 3     omega 3 1000 MG CAPS Take 2,000 mg by mouth 2 times daily 360 capsule 3     rosuvastatin (CRESTOR) 20 MG tablet Take 1 tablet (20 mg) by mouth daily 90 tablet 0     Telmisartan-Amlodipine (TWYNSTA) 40-5 MG TABS Take 1 tablet by mouth daily 90 tablet 0     VITAMIN E  COMPLEX PO Take 400 Units by mouth daily       [DISCONTINUED] isosorbide mononitrate (IMDUR) 30 MG 24 hr tablet Take 0.5 tablets (15 mg) by mouth daily 45 tablet 0     [DISCONTINUED] nitroglycerin (NITROSTAT) 0.4 MG SL tablet Place one tab under the tongue for chest pain, may repeat every 5 minutes x2 25 tablet 3                Data:   All laboratory data reviewed  No results found for this or any previous visit (from the past 24 hour(s)).    All laboratory data reviewed  Lab Results   Component Value Date    CHOL 178 07/11/2016     Lab Results   Component Value Date    HDL 54 07/11/2016     Lab Results   Component Value Date    LDL 79 07/11/2016     Lab Results   Component Value Date    TRIG 223 07/11/2016     Lab Results   Component Value Date    CHOLHDLRATIO 3.5 11/26/2014     TSH   Date Value Ref Range Status   04/12/2016 2.41 0.40 - 4.00 mU/L Final     Last Basic Metabolic Panel:  Lab Results   Component Value Date     11/24/2017      Lab Results   Component Value Date    POTASSIUM 4.0 11/24/2017     Lab Results   Component Value Date    CHLORIDE 111 11/24/2017     Lab Results   Component Value Date    RICO 8.7 11/24/2017     Lab Results   Component Value Date    CO2 27 11/24/2017     Lab Results   Component Value Date    BUN 14 11/24/2017     Lab Results   Component Value Date    CR 0.62 11/24/2017     Lab Results   Component Value Date     11/24/2017     Lab Results   Component Value Date    WBC 6.7 04/12/2016     Lab Results   Component Value Date    RBC 4.31 04/12/2016     Lab Results   Component Value Date    HGB 13.1 04/12/2016     Lab Results   Component Value Date    HCT 40.2 04/12/2016     Lab Results   Component Value Date    MCV 93 04/12/2016     Lab Results   Component Value Date    MCH 30.4 04/12/2016     Lab Results   Component Value Date    MCHC 32.6 04/12/2016     Lab Results   Component Value Date    RDW 12.4 04/12/2016     Lab Results   Component Value Date     04/12/2016

## 2018-04-18 NOTE — MR AVS SNAPSHOT
After Visit Summary   4/18/2018    Alf Hargrove    MRN: [id number]           Patient Information     Date Of Birth          1945        Visit Information        Provider Department      4/18/2018 10:30 AM Rad Malcolm MD; [employer] [facility]        Today's Diagnoses     Coronary atherosclerosis due to lipid rich plaque        Coronary artery disease involving native coronary artery of native heart without angina pectoris           Follow-ups after your visit        Additional Services     Follow-Up with Cardiologist                 Future tests that were ordered for you today     Open Future Orders        Priority Expected Expires Ordered    Echocardiogram Routine 4/18/2019 4/19/2019 4/18/2018    Follow-Up with Cardiologist Routine 4/18/2019 4/19/2019 4/18/2018            Who to contact     If you have questions or need follow up information about today's clinic visit or your schedule please contact [facility] directly at [phone].  Normal or non-critical lab and imaging results will be communicated to you by Pax8hart, letter or phone within 4 business days after the clinic has received the results. If you do not hear from us within 7 days, please contact the clinic through Orckit [employer] or phone. If you have a critical or abnormal lab result, we will notify you by phone as soon as possible.  Submit refill requests through REEL [employer]ified or call your pharmacy and they will forward the refill request to us. Please allow 3 business days for your refill to be completed.          Additional Information About Your Visit        MyChart Information     [employer] gives you secure access to your electronic health record. If you see a primary care provider, you can also send messages to your care team and make appointments. If you have questions, please call your primary care clinic.  If you do not have  "a primary care provider, please call [phone] and they will assist you.        Care EveryWhere ID     This is your Care EveryWhere ID. This could be used by other organizations to access your [facility] medical records  [id number]        Your Vitals Were     Pulse Height Breastfeeding? BMI (Body Mass Index)          80 1.511 m (4' 11.5\") No 23.18 kg/m2         Blood Pressure from Last 3 Encounters:   04/18/18 128/72   11/24/17 110/70   01/09/17 124/68    Weight from Last 3 Encounters:   04/18/18 52.9 kg (116 lb 11.2 oz)   11/24/17 52.2 kg (115 lb)   01/09/17 50.8 kg (112 lb)                 Today's Medication Changes          These changes are accurate as of 4/18/18 11:15 AM.  If you have any questions, ask your nurse or doctor.               These medicines have changed or have updated prescriptions.        Dose/Directions    * fish oil-omega-3 fatty acids 1000 MG capsule   This may have changed:  Another medication with the same name was added. Make sure you understand how and when to take each.   Changed by:  Rad Malcolm MD        Dose:  2 g   Take 2 g by mouth daily   Refills:  0       * omega 3 1000 MG Caps   This may have changed:  You were already taking a medication with the same name, and this prescription was added. Make sure you understand how and when to take each.   Used for:  Coronary atherosclerosis due to lipid rich plaque   Changed by:  Rad Malcolm MD        Dose:  2000 mg   Take 2,000 mg by mouth 2 times daily   Quantity:  360 capsule   Refills:  3       isosorbide mononitrate 30 MG 24 hr tablet   Commonly known as:  IMDUR   This may have changed:  how much to take   Used for:  Coronary atherosclerosis due to lipid rich plaque   Changed by:  Rad Malcolm MD        Dose:  30 mg   Take 1 tablet (30 mg) by mouth daily   Quantity:  90 tablet   Refills:  3       * Notice:  This list has 2 medication(s) that are the same as other medications prescribed for you. Read the " directions carefully, and ask your doctor or other care provider to review them with you.         Where to get your medicines      These medications were sent to [facility] [place], [place] - [address]  [place], [facility] [address]     Phone:  [phone]     isosorbide mononitrate 30 MG 24 hr tablet    nitroGLYcerin 0.4 MG sublingual tablet    omega 3 1000 MG Caps                Primary Care Provider Office Phone # Fax #    Tracey Bell [phone]       [address]  [facility] [address]        Equal Access to Services     [facility]: Hadii aad ku hadasho Soomaali, waaxda luqadaha, qaybta kaalmada adeegyada, waxay idiin hayaan adeclem sloan . So [facility] [phone].    ATENCIÓN: Si habla español, tiene a barber disposición servicios gratuitos de asistencia lingüística. [facility] [phone].    We comply with applicable federal civil rights laws and [place] laws. We do not discriminate on the basis of race, color, national origin, age, disability, sex, sexual orientation, or gender identity.            Thank you!     Thank you for choosing [facility]  for your care. Our goal is always to provide you with excellent care. Hearing back from our patients is one way we can continue to improve our services. Please take a few minutes to complete the written survey that you may receive in the mail after your visit with us. Thank you!             Your Updated Medication List - Protect others around you: Learn how to safely use, store and throw away your medicines at www.disposemymeds.org.          This list is accurate as of 4/18/18 11:15 AM.  Always use your most recent med list.                   Brand Name Dispense Instructions for use Diagnosis    aspirin 81 MG tablet     30 tablet    Take 1 tablet (81 mg) by mouth daily        * fish oil-omega-3 fatty acids 1000 MG capsule      Take 2 g by mouth  daily        * omega 3 1000 MG Caps     360 capsule    Take 2,000 mg by mouth 2 times daily    Coronary atherosclerosis due to lipid rich plaque       glucosamine 500 MG Caps           isosorbide mononitrate 30 MG 24 hr tablet    IMDUR    90 tablet    Take 1 tablet (30 mg) by mouth daily    Coronary atherosclerosis due to lipid rich plaque       nebivolol 2.5 MG tablet    BYSTOLIC    90 tablet    Take 1 tablet (2.5 mg) by mouth daily    Essential hypertension, benign       nitroGLYcerin 0.4 MG sublingual tablet    NITROSTAT    25 tablet    Place one tab under the tongue for chest pain, may repeat every 5 minutes x2    Coronary artery disease involving native coronary artery of native heart without angina pectoris, Coronary atherosclerosis due to lipid rich plaque       rosuvastatin 20 MG tablet    CRESTOR    90 tablet    Take 1 tablet (20 mg) by mouth daily    Mixed hyperlipidemia       Telmisartan-Amlodipine 40-5 MG Tabs    TWYNSTA    90 tablet    Take 1 tablet by mouth daily    Essential hypertension, benign       VITAMIN D3 PO      Take 5,000 Units by mouth every other day        VITAMIN E COMPLEX PO      Take 400 Units by mouth daily        * Notice:  This list has 2 medication(s) that are the same as other medications prescribed for you. Read the directions carefully, and ask your doctor or other care provider to review them with you.

## 2018-04-19 ENCOUNTER — TELEPHONE (OUTPATIENT)
Dept: INTERNAL MEDICINE | Facility: CLINIC | Age: 73
End: 2018-04-19

## 2018-04-19 ENCOUNTER — PATIENT OUTREACH (OUTPATIENT)
Dept: GERIATRIC MEDICINE | Facility: CLINIC | Age: 73
End: 2018-04-19

## 2018-04-19 NOTE — PROGRESS NOTES
[facility] Care Coordination Contact  Received voice mail message from Kanika Robledo  at [phone] stating that client needs a BP monitor.  Called her back and she says her  Gavi Hawthorne also could use it.  He has had fluctuating BPs.  Reviewed EPIC for both and both have diagnosis of HTN.  Ordered BP monitor from [facility].  Explained that [facility] will request an order from PCP.  Added to CPS and POC.  RENA Bolton, CCM  [facility]   Tel [phone]  Fax [phone]

## 2018-04-19 NOTE — TELEPHONE ENCOUNTER
[facility]  Blood pressure arm digital  Additional medical info needed for why she needs this  ED's office  Fax back

## 2018-04-30 ENCOUNTER — TELEPHONE (OUTPATIENT)
Dept: INTERNAL MEDICINE | Facility: CLINIC | Age: 73
End: 2018-04-30

## 2018-04-30 NOTE — TELEPHONE ENCOUNTER
Fax received from [facility] for review and signature.  Put in Dr. Bell's in basket.

## 2018-05-17 NOTE — PROGRESS NOTES
Per [facility], pb unit delivered 5/14/18.  CC notified.  Radha Cedillo  Case Management Specialist  [facility]  [phone]

## 2018-06-05 DIAGNOSIS — E78.2 MIXED HYPERLIPIDEMIA: ICD-10-CM

## 2018-06-05 DIAGNOSIS — I10 ESSENTIAL HYPERTENSION, BENIGN: ICD-10-CM

## 2018-06-05 RX ORDER — TELMISARTAN AND AMLODIPINE 5; 40 MG/1; MG/1
1 TABLET ORAL DAILY
Qty: 90 TABLET | Refills: 0 | Status: SHIPPED | OUTPATIENT
Start: 2018-06-05 | End: 2018-09-24

## 2018-06-05 RX ORDER — NEBIVOLOL 2.5 MG/1
2.5 TABLET ORAL DAILY
Qty: 90 TABLET | Refills: 0 | Status: SHIPPED | OUTPATIENT
Start: 2018-06-05 | End: 2018-09-24

## 2018-06-05 RX ORDER — ROSUVASTATIN CALCIUM 20 MG/1
20 TABLET, COATED ORAL DAILY
Qty: 90 TABLET | Refills: 0 | Status: SHIPPED | OUTPATIENT
Start: 2018-06-05 | End: 2018-09-24

## 2018-06-07 ENCOUNTER — TELEPHONE (OUTPATIENT)
Dept: CARDIOLOGY | Facility: CLINIC | Age: 73
End: 2018-06-07

## 2018-06-07 NOTE — TELEPHONE ENCOUNTER
Patient needs PA for     Telmisartan-Amlodipine (TWYNSTA) 40-5 MG TABS 90 tablet 0 6/5/2018     Sig - Route: Take 1 tablet by mouth daily - Oral    Class: E-Prescribe    Order: [id number]    E-Prescribing Status: Receipt confirmed by pharmacy (6/5/2018  3:52 PM CDT)      Will route to PA team.

## 2018-07-09 NOTE — TELEPHONE ENCOUNTER
Central Prior Authorization Team   Phone: [phone]    PA Initiation    Medication: Telmisartan-Amlodipine (TWYNSTA) 40-5 MG TABS  Insurance Company: Silver [employer] Part D - Phone [phone] Fax [phone]  Pharmacy Filling the Rx: [facility] [place], [place] - [facility] [address]  Filling Pharmacy Phone: [phone]  Filling Pharmacy Fax:    Start Date: 7/9/2018      Manually faxed PA to Camila fx: [phone]

## 2018-07-11 NOTE — TELEPHONE ENCOUNTER
Prior Authorization Approval    Authorization Effective Date: 4/10/2018  Authorization Expiration Date: 7/9/2019  Medication: Telmisartan-Amlodipine (TWYNSTA) 40-5 MG TABS - approved  Approved Dose/Quantity:   Reference #:     Insurance Company: Silver [employer] Part D - Phone [phone] Fax [phone]  Expected CoPay: $1.29     CoPay Card Available:      Foundation Assistance Needed:    Which Pharmacy is filling the prescription (Not needed for infusion/clinic administered): [facility] [place], [place] - [facility] [address]  Pharmacy Notified: Yes  Patient Notified: Yes

## 2018-08-01 ENCOUNTER — TELEPHONE (OUTPATIENT)
Dept: INTERNAL MEDICINE | Facility: CLINIC | Age: 73
End: 2018-08-01

## 2018-08-01 NOTE — LETTER
[facility]  [address], [address]  [place], [place]  [address]                                            TEL:[phone]  FAX:[phone]      Alf Beltran [place]  [address] [facility] [address]      August 1, 2018    Dear Alf Beltran,    At [facility], we care about your health and well-being. A review of your chart has indicated that you are due for a mammogram. Please contact us at [phone] to schedule an appointment.     If you have already had one or all of the above screening tests at another facility, please call us to update your chart.        Sincerely,      Tracey Bell M.D.

## 2018-08-01 NOTE — LETTER
[facility]  [address]  [place], [place]  [address]                                            TEL:[phone]  FAX:[phone]      Alf Beltran [place]  [address] [facility] [address]      August 15, 2018    Dear Alf Beltran,        At [facility], we care about your health and well-being. A review of your chart has indicated that you are due for a mammogram. Please contact us at [phone] to schedule an appointment.     If you have already had one or all of the above screening tests at another facility, please call us to update your chart.        Sincerely,      NIRANJAN Pulido

## 2018-08-01 NOTE — TELEPHONE ENCOUNTER
Panel Management Review      Patient has the following on her problem list:     Hypertension   Last three blood pressure readings:  BP Readings from Last 3 Encounters:   04/18/18 128/72   11/24/17 110/70   01/09/17 124/68     Blood pressure: Passed    HTN Guidelines:  Age 18-59 BP range:  Less than 140/90  Age 60-85 with Diabetes:  Less than 140/90  Age 60-85 without Diabetes:  less than 150/90      Composite cancer screening  Chart review shows that this patient is due/due soon for the following Mammogram  Summary:    Patient is due/failing the following:   MAMMOGRAM    Action needed:   Patient needs office visit for see above.    Type of outreach:    Sent letter.    Questions for provider review:    None                                                                                                                                    .JESÚS KNAPP LPN       Chart routed to none .

## 2018-09-24 DIAGNOSIS — E78.2 MIXED HYPERLIPIDEMIA: ICD-10-CM

## 2018-09-24 DIAGNOSIS — I10 ESSENTIAL HYPERTENSION, BENIGN: ICD-10-CM

## 2018-09-24 RX ORDER — ROSUVASTATIN CALCIUM 20 MG/1
20 TABLET, COATED ORAL DAILY
Qty: 90 TABLET | Refills: 1 | Status: SHIPPED | OUTPATIENT
Start: 2018-09-24 | End: 2019-03-13

## 2018-09-24 RX ORDER — NEBIVOLOL 2.5 MG/1
2.5 TABLET ORAL DAILY
Qty: 90 TABLET | Refills: 1 | Status: SHIPPED | OUTPATIENT
Start: 2018-09-24 | End: 2018-11-29

## 2018-09-24 RX ORDER — TELMISARTAN AND AMLODIPINE 5; 40 MG/1; MG/1
1 TABLET ORAL DAILY
Qty: 90 TABLET | Refills: 1 | Status: SHIPPED | OUTPATIENT
Start: 2018-09-24 | End: 2019-03-13

## 2018-09-25 ENCOUNTER — HOSPITAL ENCOUNTER (OUTPATIENT)
Dept: MAMMOGRAPHY | Facility: CLINIC | Age: 73
Discharge: HOME | End: 2018-09-25
Attending: INTERNAL MEDICINE | Admitting: INTERNAL MEDICINE
Payer: COMMERCIAL

## 2018-09-25 DIAGNOSIS — Z12.31 VISIT FOR SCREENING MAMMOGRAM: ICD-10-CM

## 2018-09-25 PROCEDURE — 77067 SCR MAMMO BI INCL CAD: CPT

## 2018-09-27 DIAGNOSIS — I25.83 CORONARY ATHEROSCLEROSIS DUE TO LIPID RICH PLAQUE: ICD-10-CM

## 2018-09-27 RX ORDER — OMEGA-3 FATTY ACIDS/FISH OIL 300-1000MG
2000 CAPSULE ORAL 2 TIMES DAILY
Qty: 360 CAPSULE | Refills: 3 | Status: SHIPPED | OUTPATIENT
Start: 2018-09-27 | End: 2019-05-16

## 2018-10-03 ENCOUNTER — PATIENT OUTREACH (OUTPATIENT)
Dept: GERIATRIC MEDICINE | Facility: CLINIC | Age: 73
End: 2018-10-03

## 2018-10-03 NOTE — PROGRESS NOTES
[facility] Care Coordination Contact    First Attempt:    Call placed to member to schedule visit appointment to complete the annual HRA. No answer, left vm on home and cell phone to return writer's phone call.     RENA Campoverde  [facility]  [phone]  Fax: [phone]

## 2018-10-10 NOTE — PROGRESS NOTES
[facility] Care Coordination Contact  Second Attempt:    Call placed to member to schedule visit appointment to complete the annual HRA. No answer, left vm on home and cell phone to return writer's phone call.      RENA Campoverde  [facility]  [phone]  Fax: [phone]

## 2018-10-29 NOTE — PROGRESS NOTES
[facility] Care Coordination Contact    Third Attempt:     Call placed to member to schedule visit appointment to complete the annual HRA. No answer, left vm on home and cell phone to return writer's phone call.     Called adult jamarcus Ro to schedule annual HRA home visit. HRA has been scheduled for 11/7/18 at 1:00pm.  Called Yun Ashford and scheduled an  for the home visit.        RENA Campoverde  [facility]  [phone]  Fax: [phone]

## 2018-11-07 ENCOUNTER — PATIENT OUTREACH (OUTPATIENT)
Dept: GERIATRIC MEDICINE | Facility: CLINIC | Age: 73
End: 2018-11-07

## 2018-11-07 ASSESSMENT — ACTIVITIES OF DAILY LIVING (ADL): DEPENDENT_IADLS:: TRANSPORTATION

## 2018-11-07 NOTE — PROGRESS NOTES
[facility] Care Coordination Contact    [facility] Home Visit Assessment     Home visit for Health Risk Assessment with Alf Hargrove completed on November 7, 2018    Type of residence:: Private home - stairs  Current living arrangement:: I live in a private home with spouse     Assessment completed with:: Patient, Other, Spouse or significant other (KTTS : Kanika)    Current Care Plan  Member currently receiving the following home care services:  none  Member currently receiving the following community resources: None    Medication Review  Medication reconciliation completed in Epic: Yes  Medication set-up & administration: Independent and sets up on own monthly.  Self-administers medications.  Medication Risk Assessment Medication (1 or more, place referral to MTM): N/A: No risk factors identified  MTM Referral Placed: No: No risk factors idenified    Mental/Behavioral Health   Depression Screening: See PHQ assessment flowsheet.   Mental health DX:: No      Mental Health Diagnosis: No  Mental Health Services: None: No further intervention needed at this time.    Falls Assessment:   Fallen 2 or more times in the past year?: No   Any fall with injury in the past year?: No    ADL/IADL Dependencies:   Dependent ADLs:: Independent  Dependent IADLs:: Transportation    [facility] sponsored benefits: Shared information re: Silver Sneakers/gym memberships, ASA, Calcium +D.    PCA Assessment completed at visit: Not applicable     Elderly Waiver Eligibility: No-does not meet criteria    Care Plan & Recommendations: Alf reported no concerns. Remains independent.    See LTCC for detailed assessment information.    Follow-Up Plan: Member informed of future contact, plan to f/u with member with a 6 month telephone assessment.  Contact information shared with member and family, encouraged member to call with any questions or concerns at any time.    [place] care continuum providers: Please refer to  [facility] on the [facility] Problem List to view this patient's [facility] Care Plan Summary.    RENA Campoverde  [facility]  [phone]  Fax: [phone]

## 2018-11-15 ENCOUNTER — PATIENT OUTREACH (OUTPATIENT)
Dept: GERIATRIC MEDICINE | Facility: CLINIC | Age: 73
End: 2018-11-15

## 2018-11-15 NOTE — LETTER
November 15, 2018    Important Plan Information    ALF AVITIA  [address]  [facility] [address]  Your Care Plan  Dear Alf Beltran,  When we spoke recently, I promised to send you a Care Plan. The plan enclosed is a summary of our discussion. It includes the steps we agreed would help you meet your health goals. In addition, I can help you with:  [employer]  This program is available to members who need a ride to medical and dental visits. To schedule a ride, call [phone] or [phone] (toll free). TTY/TTD: 711. You can call Monday - Thursday 8 a.m. to 5 p.m. and Fridays 9 a.m. to 5 p.m.   [employer]   The [employer] program empowers you to improve your health through education and exercise. To learn more, visit [employer], or call behaviewer Service at [phone] (toll free) (TTY:711) from 7 a.m. - 7 p.m. Central Time, Monday-Friday.  Health Care Directive   This form helps you outline your health care wishes. You can request a form from me and I will answer any questions you have before you discuss it with your doctor.   Annual Physical  Take a key step on your path to good health and set up an annual physical at your clinic.  Questions?  Call me at [phone] Monday-Friday between 8am and 5pm.  TTY/TTD: 711. As we discussed, I plan to be in touch with you again in 6 months to follow up via phone.  Sincerely,        Aurea Tabares, [facility]  [phone]    cc: member records            American Indians can continue to use [facility] and [facility] ([facility] clinics. We will not require prior approval or impose any conditions for you to get services at these clinics. For elders age 65 years and older this includes Elderly Waiver (EW) services accessed through the [place]. If a doctor or other provider in a [facility] or [facility] clinic refers you to a provider in our network, we will not require you to see your primary care provider prior to the  referral.    For accessible formats of this publication or assistance with equal or access to our services, visit [employer]/contactmedicaid, or call [phone] (toll free) or use your preferred relay service.    Auxiliary Aids and Services.   [facility] provides auxiliary aids and services, like qualified interpreters or information in accessible formats, free of charge and in a timely manner, to ensure an equal opportunity to participate in our health care programs. Contact [employer] Customer Service at [employer]/contactmedicaid or call [phone] (toll free) or use your preferred relay service.    Language Assistance Services.   [facility] provides translated documents and spoken language interpreting, free of charge and in a timely manner, when language assistance services are necessary to ensure limited English speakers have meaningful access to our information and services. Contact [employer] Service at [employer]/contactmedicaid or call [phone] (toll free) or use your preferred relay service.     Civil Rights Notice  Discrimination is against the law. [facility] does not discriminate on the basis of any of the following:    Race    Color    National Origin    Creed    Buddhist    Age    Public Assistance Status    Receipt of Health Care Services    Disability (including physical or mental impairment)    Sex (including sex stereotypes and gender identity)    Marital Status    Political Beliefs    Medical Condition    Genetic Information    Sexual Orientation    Claims Experience    Medical History    Health Status    Civil Rights Complaints.   You have the right to file a discrimination complaint if you believe you were treated in a discriminatory way by [facility]. You may contact any of the following four agencies directly to file a discrimination complaint.    U.S. Department of Health and Human Services  Office for Civil Rights (OCR)  You have the right to file a complaint with the OCR, a federal agency,  if you believe you have been discriminated against because of any of the following:    Race    Disability    Color    Sex (including sex stereotypes and gender identity)    National Origin    Age    Contact the OCR directly to file a complaint:         Director         U.S. Department of Health and Human Services  Office for Civil Rights         [address] [place] [address]         [address] [address]         [phone] (Voice)         [phone] (TDD)         Complaint Portal - [email or web address]/[email or web address]/[email or web address]     [place] Department of Human Rights (MDHR)  In [place], you have the right to file a complaint with the MDHR if you believe you have been discriminated against because of any of the following:      Race    Color    National Origin    Gnosticist    Creed    Sex    Sexual Orientation    Marital Status    Public Assistance Status    Contact the MD directly to file a complaint:         [place] Department of Human Rights         [address] [address]         [phone] (voice)          [phone] (toll free)         711 or [phone] ([place] Relay)         [phone] (Fax)         [email or web address] (Email)     [place] Department of Human Services (DHS)  You have the right to file a complaint with [facility] if you believe you have been discriminated against in our health care programs because of any of the following:    Race    Color    National Origin    Creed    Gnosticist    Age    Public Assistance Status    Receipt of Health Care Services    Disability (including physical or mental impairment)    Sex (including sex stereotypes and gender identity)    Marital Status    Political Beliefs    Medical Condition    Genetic Information    Sexual Orientation    Claims Experience    Medical History    Health Status    Complaints must be in writing and filed within 180 days of the date you discovered the  alleged discrimination. The complaint must contain your name and address and describe the discrimination you are complaining about. After we get your complaint, we will review it and notify you in writing about whether we have authority to investigate. If we do, we will investigate the complaint.      [facility] will notify you in writing of the investigation s outcome. You have a right to appeal the outcome if you disagree with the decision. To appeal, you must send a written request to have [facility] review the investigation outcome period. Be brief and state why you disagree with the decision. Include additional information you think is important.      If you file a complaint in this way, the people who work for the agency named in the complaint cannot retaliate against you. This means they cannot punish you in any way for filing a complaint. Filing a complaint in this way does not stop you from seeking out other legal or administration actions.     Contact [facility] directly to file a discrimination complaint:        ATTN: Civil Rights Coordinator        [place] Department of Human [facility]        Equal Opportunity and Access Division        [address] [address]        [place] [address] [phone] (voice) or use your preferred relay service     [facility] Complaint Notice   Contact [facility] directly to file a discrimination complaint:  [facility] Civil Rights Coordinator  Mail Route   [address]  [place] [address] [phone] (voice) or use your preferred relay service  [email or web address]

## 2018-11-15 NOTE — PROGRESS NOTES
[facility] Care Coordination Contact    Received after visit chart from care coordinator.  Completed following tasks: Mailed copy of care plan to client and Entered MMIS  Chart was returned to CC.   Radha Cedillo  Case Management Specialist  [facility]  [phone]

## 2018-11-29 DIAGNOSIS — I10 ESSENTIAL HYPERTENSION, BENIGN: ICD-10-CM

## 2018-11-29 RX ORDER — NEBIVOLOL 2.5 MG/1
2.5 TABLET ORAL DAILY
Qty: 90 TABLET | Refills: 3 | Status: SHIPPED | OUTPATIENT
Start: 2018-11-29

## 2019-03-13 DIAGNOSIS — I10 ESSENTIAL HYPERTENSION, BENIGN: ICD-10-CM

## 2019-03-13 DIAGNOSIS — E78.2 MIXED HYPERLIPIDEMIA: ICD-10-CM

## 2019-03-13 RX ORDER — ROSUVASTATIN CALCIUM 20 MG/1
20 TABLET, COATED ORAL DAILY
Qty: 90 TABLET | Refills: 0 | Status: SHIPPED | OUTPATIENT
Start: 2019-03-13 | End: 2019-06-20

## 2019-03-13 RX ORDER — TELMISARTAN AND AMLODIPINE 5; 40 MG/1; MG/1
1 TABLET ORAL DAILY
Qty: 90 TABLET | Refills: 0 | Status: SHIPPED | OUTPATIENT
Start: 2019-03-13 | End: 2019-06-20

## 2019-04-10 ENCOUNTER — PATIENT OUTREACH (OUTPATIENT)
Dept: GERIATRIC MEDICINE | Facility: CLINIC | Age: 74
End: 2019-04-10

## 2019-04-10 NOTE — PROGRESS NOTES
[facility] Care Coordination Contact      [facility] Six-Month Telephone Assessment    6 month telephone assessment completed on 4/10/19.    ER visits: No  Hospitalizations: No  TCU stays: No  Significant health status changes: no change. Doing well.  Falls/Injuries: No  ADL/IADL changes: No  Changes in services: No services    Caregiver Assessment follow up:  N/A    Goals: See POC in chart for goal progress documentation.      Will see member in 6 months for an annual health risk assessment.   Encouraged member to call CC with any questions or concerns in the meantime.  RENA Bolton, [facility] Care Coordinator  Tel [phone]  Fax [phone]

## 2019-05-10 ENCOUNTER — HOSPITAL ENCOUNTER (OUTPATIENT)
Dept: CARDIOLOGY | Facility: CLINIC | Age: 74
Discharge: HOME | End: 2019-05-10
Attending: INTERNAL MEDICINE | Admitting: INTERNAL MEDICINE
Payer: COMMERCIAL

## 2019-05-10 DIAGNOSIS — I25.83 CORONARY ATHEROSCLEROSIS DUE TO LIPID RICH PLAQUE: ICD-10-CM

## 2019-05-10 PROCEDURE — 93306 TTE W/DOPPLER COMPLETE: CPT | Mod: 26 | Performed by: INTERNAL MEDICINE

## 2019-05-10 PROCEDURE — 93306 TTE W/DOPPLER COMPLETE: CPT

## 2019-05-16 ENCOUNTER — OFFICE VISIT (OUTPATIENT)
Dept: CARDIOLOGY | Facility: CLINIC | Age: 74
End: 2019-05-16
Payer: COMMERCIAL

## 2019-05-16 VITALS
BODY MASS INDEX: 21.75 KG/M2 | WEIGHT: 110.8 LBS | HEIGHT: 60 IN | DIASTOLIC BLOOD PRESSURE: 74 MMHG | HEART RATE: 72 BPM | SYSTOLIC BLOOD PRESSURE: 136 MMHG

## 2019-05-16 DIAGNOSIS — I25.83 CORONARY ATHEROSCLEROSIS DUE TO LIPID RICH PLAQUE: ICD-10-CM

## 2019-05-16 PROCEDURE — 99213 OFFICE O/P EST LOW 20 MIN: CPT | Performed by: INTERNAL MEDICINE

## 2019-05-16 RX ORDER — OMEGA-3 FATTY ACIDS/FISH OIL 300-1000MG
2000 CAPSULE ORAL 2 TIMES DAILY
Qty: 360 CAPSULE | Refills: 3 | Status: SHIPPED | OUTPATIENT
Start: 2019-05-16

## 2019-05-16 ASSESSMENT — MIFFLIN-ST. JEOR: SCORE: 916.15

## 2019-05-16 NOTE — LETTER
"5/16/2019    Tracey Bell MD  [address] [facility] [address]    RE: Alf Hargrove       Dear Colleague,    I had the pleasure of seeing Alf Beltran Delvin in the [facility].    Cardiology Progress Note          Assessment and Plan:     1. Coronary artery disease, medically managed    Stable angina on Imdur 30.    Continue current medical regimen.    Refill medications.    Routine follow-up in 1 to 2 years.      This note was transcribed using electronic voice recognition software and there may be typographical errors present.                Interval History:   The patient is a very pleasant 73 year old [place]ian female with history of coronary artery disease with mild ischemic cardiomyopathy, medically managed on Imdur.  She states that she has felt pretty well this past year.  No escalation of symptoms.  No exertional chest discomfort since increasing the Imdur to 30 mg daily.                     Review of Systems:   Review of Systems:  Skin:  Negative     Eyes:  Negative    ENT:  Positive for hearing loss  Respiratory:  Negative    Cardiovascular:  Negative    Gastroenterology: Negative    Genitourinary:  Negative    Musculoskeletal:  Positive for back pain;joint pain  Neurologic:  Negative    Psychiatric:  Negative    Heme/Lymph/Imm:  Negative    Endocrine:  Negative                Physical Exam:     Vitals: /74 (BP Location: Right arm, Patient Position: Sitting, Cuff Size: Adult Regular)   Pulse 72   Ht 1.511 m (4' 11.5\")   Wt 50.3 kg (110 lb 12.8 oz)   Breastfeeding? No   BMI 22.00 kg/m     Constitutional:  cooperative, alert and oriented, well developed, well nourished, in no acute distress   NAD, alert     Skin:  warm and dry to the touch, no apparent skin lesions or masses noted   dry and warm    Head:  normocephalic, no masses or lesions   atraumatic    Eyes:  pupils equal and round, conjunctivae and lids unremarkable, sclera white, no xanthalasma, EOMS " intact, no nystagmus   EOMI    ENT:  no pallor or cyanosis   speech normal, midline tongue    Neck:  JVP normal   supple    Chest:  normal breath sounds, clear to auscultation, normal A-P diameter, normal symmetry, normal respiratory excursion, no use of accessory muscles   clear to ascultation    Cardiac:         early systolic murmur;RUSB;grade 2          Abdomen:      benign    Vascular: pulses full and equal                                      Extremities and Back:  no deformities, clubbing, cyanosis, erythema observed;no edema        Neurological:  no gross motor deficits;affect appropriate                 Medications:     Current Outpatient Medications   Medication Sig Dispense Refill     aspirin 81 MG tablet Take 1 tablet (81 mg) by mouth daily 30 tablet      Cholecalciferol (VITAMIN D3 PO) Take 5,000 Units by mouth every other day       fish oil-omega-3 fatty acids 1000 MG capsule Take 2 g by mouth daily       glucosamine 500 MG CAPS        isosorbide mononitrate (IMDUR) 30 MG 24 hr tablet Take 1 tablet (30 mg) by mouth daily 90 tablet 3     nebivolol (BYSTOLIC) 2.5 MG tablet Take 1 tablet (2.5 mg) by mouth daily 90 tablet 3     nitroGLYcerin (NITROSTAT) 0.4 MG sublingual tablet Place one tab under the tongue for chest pain, may repeat every 5 minutes x2 25 tablet 3     omega 3 1000 MG CAPS Take 2,000 mg by mouth 2 times daily 360 capsule 3     rosuvastatin (CRESTOR) 20 MG tablet Take 1 tablet (20 mg) by mouth daily 90 tablet 0     Telmisartan-amLODIPine (TWYNSTA) 40-5 MG TABS Take 1 tablet by mouth daily 90 tablet 0     VITAMIN E COMPLEX PO Take 400 Units by mouth daily                  Data:   All laboratory data reviewed  No results found for this or any previous visit (from the past 24 hour(s)).    All laboratory data reviewed  Lab Results   Component Value Date    CHOL 178 07/11/2016     Lab Results   Component Value Date    HDL 54 07/11/2016     Lab Results   Component Value Date    LDL 79 07/11/2016      Lab Results   Component Value Date    TRIG 223 07/11/2016     Lab Results   Component Value Date    CHOLHDLRATIO 3.5 11/26/2014     TSH   Date Value Ref Range Status   04/12/2016 2.41 0.40 - 4.00 mU/L Final     Last Basic Metabolic Panel:  Lab Results   Component Value Date     11/24/2017      Lab Results   Component Value Date    POTASSIUM 4.0 11/24/2017     Lab Results   Component Value Date    CHLORIDE 111 11/24/2017     Lab Results   Component Value Date    RICO 8.7 11/24/2017     Lab Results   Component Value Date    CO2 27 11/24/2017     Lab Results   Component Value Date    BUN 14 11/24/2017     Lab Results   Component Value Date    CR 0.62 11/24/2017     Lab Results   Component Value Date     11/24/2017     Lab Results   Component Value Date    WBC 6.7 04/12/2016     Lab Results   Component Value Date    RBC 4.31 04/12/2016     Lab Results   Component Value Date    HGB 13.1 04/12/2016     Lab Results   Component Value Date    HCT 40.2 04/12/2016     Lab Results   Component Value Date    MCV 93 04/12/2016     Lab Results   Component Value Date    MCH 30.4 04/12/2016     Lab Results   Component Value Date    MCHC 32.6 04/12/2016     Lab Results   Component Value Date    RDW 12.4 04/12/2016     Lab Results   Component Value Date     04/12/2016             Thank you for allowing me to participate in the care of your patient.    Sincerely,     Rad Malcolm MD     [facility]

## 2019-05-16 NOTE — PROGRESS NOTES
"Cardiology Progress Note          Assessment and Plan:     1. Coronary artery disease, medically managed    Stable angina on Imdur 30.    Continue current medical regimen.    Refill medications.    Routine follow-up in 1 to 2 years.      This note was transcribed using electronic voice recognition software and there may be typographical errors present.                Interval History:   The patient is a very pleasant 73 year old Armenian female with history of coronary artery disease with mild ischemic cardiomyopathy, medically managed on Imdur.  She states that she has felt pretty well this past year.  No escalation of symptoms.  No exertional chest discomfort since increasing the Imdur to 30 mg daily.                     Review of Systems:   Review of Systems:  Skin:  Negative     Eyes:  Negative    ENT:  Positive for hearing loss  Respiratory:  Negative    Cardiovascular:  Negative    Gastroenterology: Negative    Genitourinary:  Negative    Musculoskeletal:  Positive for back pain;joint pain  Neurologic:  Negative    Psychiatric:  Negative    Heme/Lymph/Imm:  Negative    Endocrine:  Negative                Physical Exam:     Vitals: /74 (BP Location: Right arm, Patient Position: Sitting, Cuff Size: Adult Regular)   Pulse 72   Ht 1.511 m (4' 11.5\")   Wt 50.3 kg (110 lb 12.8 oz)   Breastfeeding? No   BMI 22.00 kg/m    Constitutional:  cooperative, alert and oriented, well developed, well nourished, in no acute distress   NAD, alert     Skin:  warm and dry to the touch, no apparent skin lesions or masses noted   dry and warm    Head:  normocephalic, no masses or lesions   atraumatic    Eyes:  pupils equal and round, conjunctivae and lids unremarkable, sclera white, no xanthalasma, EOMS intact, no nystagmus   EOMI    ENT:  no pallor or cyanosis   speech normal, midline tongue    Neck:  JVP normal   supple    Chest:  normal breath sounds, clear to auscultation, normal A-P diameter, normal symmetry, normal " respiratory excursion, no use of accessory muscles   clear to ascultation    Cardiac:         early systolic murmur;RUSB;grade 2          Abdomen:      benign    Vascular: pulses full and equal                                      Extremities and Back:  no deformities, clubbing, cyanosis, erythema observed;no edema        Neurological:  no gross motor deficits;affect appropriate                 Medications:     Current Outpatient Medications   Medication Sig Dispense Refill     aspirin 81 MG tablet Take 1 tablet (81 mg) by mouth daily 30 tablet      Cholecalciferol (VITAMIN D3 PO) Take 5,000 Units by mouth every other day       fish oil-omega-3 fatty acids 1000 MG capsule Take 2 g by mouth daily       glucosamine 500 MG CAPS        isosorbide mononitrate (IMDUR) 30 MG 24 hr tablet Take 1 tablet (30 mg) by mouth daily 90 tablet 3     nebivolol (BYSTOLIC) 2.5 MG tablet Take 1 tablet (2.5 mg) by mouth daily 90 tablet 3     nitroGLYcerin (NITROSTAT) 0.4 MG sublingual tablet Place one tab under the tongue for chest pain, may repeat every 5 minutes x2 25 tablet 3     omega 3 1000 MG CAPS Take 2,000 mg by mouth 2 times daily 360 capsule 3     rosuvastatin (CRESTOR) 20 MG tablet Take 1 tablet (20 mg) by mouth daily 90 tablet 0     Telmisartan-amLODIPine (TWYNSTA) 40-5 MG TABS Take 1 tablet by mouth daily 90 tablet 0     VITAMIN E COMPLEX PO Take 400 Units by mouth daily                  Data:   All laboratory data reviewed  No results found for this or any previous visit (from the past 24 hour(s)).    All laboratory data reviewed  Lab Results   Component Value Date    CHOL 178 07/11/2016     Lab Results   Component Value Date    HDL 54 07/11/2016     Lab Results   Component Value Date    LDL 79 07/11/2016     Lab Results   Component Value Date    TRIG 223 07/11/2016     Lab Results   Component Value Date    CHOLHDLRATIO 3.5 11/26/2014     TSH   Date Value Ref Range Status   04/12/2016 2.41 0.40 - 4.00 mU/L Final     Last  Basic Metabolic Panel:  Lab Results   Component Value Date     11/24/2017      Lab Results   Component Value Date    POTASSIUM 4.0 11/24/2017     Lab Results   Component Value Date    CHLORIDE 111 11/24/2017     Lab Results   Component Value Date    RICO 8.7 11/24/2017     Lab Results   Component Value Date    CO2 27 11/24/2017     Lab Results   Component Value Date    BUN 14 11/24/2017     Lab Results   Component Value Date    CR 0.62 11/24/2017     Lab Results   Component Value Date     11/24/2017     Lab Results   Component Value Date    WBC 6.7 04/12/2016     Lab Results   Component Value Date    RBC 4.31 04/12/2016     Lab Results   Component Value Date    HGB 13.1 04/12/2016     Lab Results   Component Value Date    HCT 40.2 04/12/2016     Lab Results   Component Value Date    MCV 93 04/12/2016     Lab Results   Component Value Date    MCH 30.4 04/12/2016     Lab Results   Component Value Date    MCHC 32.6 04/12/2016     Lab Results   Component Value Date    RDW 12.4 04/12/2016     Lab Results   Component Value Date     04/12/2016

## 2019-06-20 DIAGNOSIS — I25.83 CORONARY ATHEROSCLEROSIS DUE TO LIPID RICH PLAQUE: ICD-10-CM

## 2019-06-20 DIAGNOSIS — E78.2 MIXED HYPERLIPIDEMIA: ICD-10-CM

## 2019-06-20 DIAGNOSIS — I10 ESSENTIAL HYPERTENSION, BENIGN: ICD-10-CM

## 2019-06-20 RX ORDER — ISOSORBIDE MONONITRATE 30 MG/1
30 TABLET, EXTENDED RELEASE ORAL DAILY
Qty: 90 TABLET | Refills: 3 | Status: SHIPPED | OUTPATIENT
Start: 2019-06-20

## 2019-06-20 RX ORDER — TELMISARTAN AND AMLODIPINE 5; 40 MG/1; MG/1
1 TABLET ORAL DAILY
Qty: 90 TABLET | Refills: 3 | Status: SHIPPED | OUTPATIENT
Start: 2019-06-20

## 2019-06-20 RX ORDER — ROSUVASTATIN CALCIUM 20 MG/1
20 TABLET, COATED ORAL DAILY
Qty: 90 TABLET | Refills: 3 | Status: SHIPPED | OUTPATIENT
Start: 2019-06-20

## 2019-07-02 ENCOUNTER — OFFICE VISIT (OUTPATIENT)
Dept: INTERNAL MEDICINE | Facility: CLINIC | Age: 74
End: 2019-07-02
Payer: COMMERCIAL

## 2019-07-02 VITALS
SYSTOLIC BLOOD PRESSURE: 117 MMHG | HEIGHT: 60 IN | RESPIRATION RATE: 12 BRPM | DIASTOLIC BLOOD PRESSURE: 72 MMHG | HEART RATE: 80 BPM | OXYGEN SATURATION: 98 % | TEMPERATURE: 98 F | BODY MASS INDEX: 21.01 KG/M2 | WEIGHT: 107 LBS

## 2019-07-02 DIAGNOSIS — M54.41 BILATERAL LOW BACK PAIN WITH RIGHT-SIDED SCIATICA, UNSPECIFIED CHRONICITY: Primary | ICD-10-CM

## 2019-07-02 DIAGNOSIS — M79.18 PIRIFORMIS MUSCLE PAIN: ICD-10-CM

## 2019-07-02 PROCEDURE — 99213 OFFICE O/P EST LOW 20 MIN: CPT | Performed by: INTERNAL MEDICINE

## 2019-07-02 ASSESSMENT — MIFFLIN-ST. JEOR: SCORE: 898.91

## 2019-07-02 NOTE — NURSING NOTE
"/72   Pulse 80   Temp 98  F (36.7  C) (Oral)   Resp 12   Ht 1.511 m (4' 11.5\")   Wt 48.5 kg (107 lb)   SpO2 98%   Breastfeeding? No   BMI 21.25 kg/m      "

## 2019-07-02 NOTE — PROGRESS NOTES
Subjective     Alf Hargrove is a 73 year old female who presents to clinic today for the following health issues:    Patient is here with her granddaughter and an interpretor.    HPI     Bilateral low back pain with right-sided sciatica, unspecified chronicity  Patient has been experiencing pain in her hip/buttocks area and it radiates down her leg. This has been occurring for the past two months. Says that the pain is constant but it worsen when she standing up for a long period of time. Patient notes that she has difficulties with stairs with the pain more than when she walks. Denies any trauma to the area or any change in physical activities. Mentions that she had similar pain about a year and half ago, however, that has resolved itself when she increased her exercise activities.    Denies any weakness.  She does have tingling down her leg at times.     Other problems...  -Patient reports that she follows up with cardiology and denies SOB or chest pain.   -Patient declined colonoscopy screening appointment.      Recent Labs   Lab Test 11/24/17  1125 07/11/16  1140 04/12/16  1145  11/26/14  0857   LDL  --  79 116*  --  89   HDL  --  54 64  --  56   TRIG  --  223* 212*  --  249*   ALT 31 23 39  --   --    CR 0.62  --  0.61   < > 0.55*   GFRESTIMATED >90  --  >90  Non  GFR Calc     < > >90   GFRESTBLACK >90  --  >90  African American GFR Calc     < > >90   POTASSIUM 4.0  --  4.0   < > 3.7   TSH  --   --  2.41  --   --     < > = values in this interval not displayed.      BP Readings from Last 3 Encounters:   07/02/19 117/72   05/16/19 136/74   04/18/18 128/72    Wt Readings from Last 3 Encounters:   07/02/19 48.5 kg (107 lb)   05/16/19 50.3 kg (110 lb 12.8 oz)   04/18/18 52.9 kg (116 lb 11.2 oz)          Reviewed and updated as needed this visit by Provider  Allergies  Meds         Review of Systems   ROS COMP: CONSTITUTIONAL: NEGATIVE for fever, chills, change in weight  RESP: NEGATIVE for  "significant cough or SOB  CV: NEGATIVE for chest pain, palpitations or peripheral edema  Neuro: denies weakness  MSK: back pain radiating down leg  PSYCHIATRIC: NEGATIVE for changes in mood or affect    This document serves as a record of the services and decisions personally performed and made by Tracey Bell MD. It was created on his behalf by Kasandra Funez, a trained medical scribe. The creation of this document is based on the provider's statements to the medical scribe.  Kasandra Funez July 2, 2019 4:07 PM       Objective    /72   Pulse 80   Temp 98  F (36.7  C) (Oral)   Resp 12   Ht 1.511 m (4' 11.5\")   Wt 48.5 kg (107 lb)   SpO2 98%   Breastfeeding? No   BMI 21.25 kg/m    Body mass index is 21.25 kg/m .  Physical Exam   GENERAL: healthy, alert and no distress  RESP: lungs clear to auscultation - no rales, rhonchi or wheezes  CV: regular rate and rhythm, normal S1 S2, no S3 or S4, click or rub, no peripheral edema and peripheral pulses strong but positive for 2/6 systolic ejection murmur    MS: no gross musculoskeletal defects noted, no edema, Positive for tenderness on the piriformis muscle. No paraspinal lumbar tenderness appreciated  Neuro: Positive for 5 out of 5 strength on the lower extremities and positive for right straight leg   PSYCH: mentation appears normal, affect normal/bright    Diagnostic Test Results:  Labs reviewed in Epic  No results found for this or any previous visit (from the past 24 hour(s)).        Assessment & Plan     (M54.41) Bilateral low back pain with right-sided sciatica, unspecified chronicity  (primary encounter diagnosis)  Comment: 2 months of symptoms  Plan: [facility] PT, HAND, AND CHIROPRACTIC REFERRAL        (M79.18) Piriformis muscle pain  Comment:   Plan: Physical therapy, as above      FUTURE APPOINTMENTS:  Return in about 3 months (around 10/2/2019) for Physical Exam.     The information in this document, created by the medical scribe for me, accurately reflects " the services I personally performed and the decisions made by me. I have reviewed and approved this document for accuracy.   July 2, 2019 4:20 PM   Tracey Bell MD  [facility]

## 2019-07-03 ENCOUNTER — THERAPY VISIT (OUTPATIENT)
Dept: PHYSICAL THERAPY | Facility: CLINIC | Age: 74
End: 2019-07-03
Attending: INTERNAL MEDICINE
Payer: COMMERCIAL

## 2019-07-03 DIAGNOSIS — M54.41 BILATERAL LOW BACK PAIN WITH RIGHT-SIDED SCIATICA, UNSPECIFIED CHRONICITY: ICD-10-CM

## 2019-07-03 DIAGNOSIS — M54.41 ACUTE RIGHT-SIDED LOW BACK PAIN WITH RIGHT-SIDED SCIATICA: ICD-10-CM

## 2019-07-03 PROCEDURE — 97110 THERAPEUTIC EXERCISES: CPT | Mod: GP | Performed by: PHYSICAL THERAPIST

## 2019-07-03 PROCEDURE — 97161 PT EVAL LOW COMPLEX 20 MIN: CPT | Mod: GP | Performed by: PHYSICAL THERAPIST

## 2019-07-03 PROCEDURE — 97112 NEUROMUSCULAR REEDUCATION: CPT | Mod: GP | Performed by: PHYSICAL THERAPIST

## 2019-07-03 NOTE — PROGRESS NOTES
[facility] Initial Evaluation  Subjective:  The history is provided by the patient. The history is limited by a language barrier. A  was used.   Type of problem:  Lumbar   Condition occurred with:  Repetition/overuse and insidious onset. This is a recurrent condition   Problem details: Pt reports that her low back and her right hip region are hurting her and the pain sometimes goes down into the right leg. She reports that she was walking around and was very busy and the back got gradually worse. She denies any traumatic mechanism. She has some numbness, but otherwise denies vague symptoms. She reports that standing and walking are most difficult, but things are getting better with time.   .   Patient reports pain:  Lower lumbar spine and lumbar spine right. Radiates to:  Lower leg right. Associated symptoms:  Numbness. Symptoms are exacerbated by walking, standing, certain positions, lying down and lifting and relieved by rest.    Alf Hargrove being seen for Low Back, R leg, R buttock pain.   Problem began 5/3/2019. Where condition occurred: at home and for unknown reasons.Problem occurred: After standing for a long time  Pain score: more of a tightness currently, goes up to 4/10 or so at times. General health as reported by patient is fair. Pertinent medical history includes:  Heart problems, high blood pressure, kidney disease, menopausal and numbness/tingling.   Other medical allergies details: none.  Surgeries include:  Heart surgery. Other surgery history details: stent.  Current medications:  Cardiac medication and high blood pressure medication.   Primary job tasks include:  Repetitive tasks, lifting/carrying and prolonged sitting.  Pain is described as aching and is intermittent. Pain is worse during the day. Since onset symptoms are gradually improving.  Previous treatment includes physical therapy. There was mild improvement following previous treatment.   Patient  is housewife.   Barriers include:  None as reported by patient.  Red flags:  None as reported by patient.                      Objective:        Flexibility/Screens:   Negative screens: Hip (Full hip ROM gemma, CANDELARIA and FADDIR - gemma)     Lower Extremity:  Decreased left lower extremity flexibility:Hamstrings    Decreased right lower extremity flexibility:  Hip IR's; Piriformis and Hamstrings               Lumbar/SI Evaluation  ROM:    AROM Lumbar:   Flexion:            Full no pain  Ext:                    25% limited improves with repeated   Side Bend:        Left:  Full no slight pain    Right:  Full no pain  Rotation:           Left:  Full    Right:  Full  Side Glide:        Left:     Right:           Lumbar Myotomes:  normal  T12-L3 (Hip Flex):  Left: 5    Right: 5  L2-4 (Quads):  Left:  5    Right:  5  L4 (Ankle DF):  Left:  5    Right:  5  L5 (Great Toe Ext): Left: 5    Right: 5   S1 (Toe Raise):  Left: 5    Right: 5  Lumbar DTR's:  normal  L4 (Quad):  Left:  2   Right:  2  S1 (Achilles):  Left:  2   Right:  2  Cord Signs:  normal    Cord sign negative:  Babinksi's left or Babinski's right  Lumbar Dermtomes:  normal  T12 Left:  Normal-light touch     T12 Right:  Normal-light touch  L1 Left:  Normal-light touch     L1 Right:  Normal-light touch  L2 Left:  Normal-light touch     L2 Right:  Normal-light touch  L3 Left:  Normal-light touch     L3 Right:  Normal-light touch  L4 Left:  Normal-light touch       L4 Right:  Normal-light touch  L5 Left:  Normal-light touch     L5 Right:  Normal-light touch  S1 Left:  Normal-light touch     S1 Right:  Normal-light touch  Neural Tension/Mobility:      Left side:SLR or Slump  negative.     Right side:   Slump or SLR  negative.   Lumbar Palpation:  normal      Functional Tests:  normal        Lumbar Provocation:  Lumbar provocation: relief with MIKIE and Prone Press-ups.      Spinal Segmental Conclusions: L1-L5 unremarkable and feels good with repeated motion                                             Hip Evaluation    Hip Strength:        Abduction:  Left: 4/5      Pain:strong/pain freeRight: 4/5     Pain:strong/pain free  Adduction:  Left: /5   Pain:strong/pain free                               General     ROS    Assessment/Plan:    Patient is a 73 year old female with lumbar complaints.    Patient has the following significant findings with corresponding treatment plan.                Diagnosis 1:  Mechanical LBP with R sided Sciatica  Pain -  manual therapy, self management, education, directional preference exercise and home program  Decreased ROM/flexibility - manual therapy, therapeutic exercise and home program  Decreased joint mobility - manual therapy, therapeutic exercise and home program  Decreased strength - therapeutic exercise, therapeutic activities and home program  Impaired gait - gait training and home program  Impaired muscle performance - neuro re-education and home program  Decreased function - therapeutic activities and home program  Impaired posture - neuro re-education and home program    Therapy Evaluation Codes:   1) History comprised of:   Personal factors that impact the plan of care:      Past/current experiences.    Comorbidity factors that impact the plan of care are:      Heart problems, High blood pressure, Menopausal and Numbness/tingling.     Medications impacting care: Cardiac and High blood pressure.  2) Examination of Body Systems comprised of:   Body structures and functions that impact the plan of care:      Hip and Lumbar spine.   Activity limitations that impact the plan of care are:      Lifting, Squatting/kneeling, Stairs, Standing and Walking.  3) Clinical presentation characteristics are:   Stable/Uncomplicated.  4) Decision-Making    Low complexity using standardized patient assessment instrument and/or measureable assessment of functional outcome.  Cumulative Therapy Evaluation is: Low complexity.    Previous and current functional  limitations:  (See Goal Flow Sheet for this information)    Short term and Long term goals: (See Goal Flow Sheet for this information)     Communication ability:  Patient appears to be able to clearly communicate and understand verbal and written communication and follow directions correctly.  Treatment Explanation - The following has been discussed with the patient:   RX ordered/plan of care  Anticipated outcomes  Possible risks and side effects  This patient would benefit from PT intervention to resume normal activities.   Rehab potential is excellent.    Frequency:  1 X week, once daily  Duration:  for 6 weeks  Discharge Plan:  Achieve all LTG.  Independent in home treatment program.  Reach maximal therapeutic benefit.    Please refer to the daily flowsheet for treatment today, total treatment time and time spent performing 1:1 timed codes.

## 2019-07-10 ENCOUNTER — THERAPY VISIT (OUTPATIENT)
Dept: PHYSICAL THERAPY | Facility: CLINIC | Age: 74
End: 2019-07-10
Payer: COMMERCIAL

## 2019-07-10 DIAGNOSIS — M54.41 ACUTE RIGHT-SIDED LOW BACK PAIN WITH RIGHT-SIDED SCIATICA: ICD-10-CM

## 2019-07-10 PROCEDURE — 97110 THERAPEUTIC EXERCISES: CPT | Mod: GP | Performed by: PHYSICAL THERAPY ASSISTANT

## 2019-07-10 PROCEDURE — 97112 NEUROMUSCULAR REEDUCATION: CPT | Mod: GP | Performed by: PHYSICAL THERAPY ASSISTANT

## 2019-07-17 ENCOUNTER — THERAPY VISIT (OUTPATIENT)
Dept: PHYSICAL THERAPY | Facility: CLINIC | Age: 74
End: 2019-07-17
Payer: COMMERCIAL

## 2019-07-17 DIAGNOSIS — M54.41 ACUTE RIGHT-SIDED LOW BACK PAIN WITH RIGHT-SIDED SCIATICA: ICD-10-CM

## 2019-07-17 PROCEDURE — 97110 THERAPEUTIC EXERCISES: CPT | Mod: GP | Performed by: PHYSICAL THERAPY ASSISTANT

## 2019-07-17 PROCEDURE — 97112 NEUROMUSCULAR REEDUCATION: CPT | Mod: GP | Performed by: PHYSICAL THERAPY ASSISTANT

## 2019-07-23 ENCOUNTER — THERAPY VISIT (OUTPATIENT)
Dept: PHYSICAL THERAPY | Facility: CLINIC | Age: 74
End: 2019-07-23
Payer: COMMERCIAL

## 2019-07-23 DIAGNOSIS — M54.41 ACUTE RIGHT-SIDED LOW BACK PAIN WITH RIGHT-SIDED SCIATICA: ICD-10-CM

## 2019-07-23 PROCEDURE — 97110 THERAPEUTIC EXERCISES: CPT | Mod: GP | Performed by: PHYSICAL THERAPY ASSISTANT

## 2019-07-23 PROCEDURE — 97112 NEUROMUSCULAR REEDUCATION: CPT | Mod: GP | Performed by: PHYSICAL THERAPY ASSISTANT

## 2019-07-30 ENCOUNTER — THERAPY VISIT (OUTPATIENT)
Dept: PHYSICAL THERAPY | Facility: CLINIC | Age: 74
End: 2019-07-30
Payer: COMMERCIAL

## 2019-07-30 DIAGNOSIS — M54.41 ACUTE RIGHT-SIDED LOW BACK PAIN WITH RIGHT-SIDED SCIATICA: ICD-10-CM

## 2019-07-30 PROCEDURE — 97112 NEUROMUSCULAR REEDUCATION: CPT | Mod: GP | Performed by: PHYSICAL THERAPY ASSISTANT

## 2019-07-30 PROCEDURE — 97110 THERAPEUTIC EXERCISES: CPT | Mod: GP | Performed by: PHYSICAL THERAPY ASSISTANT

## 2019-08-01 PROBLEM — M54.41 ACUTE RIGHT-SIDED LOW BACK PAIN WITH RIGHT-SIDED SCIATICA: Status: RESOLVED | Noted: 2019-07-03 | Resolved: 2019-08-01

## 2019-08-01 NOTE — PROGRESS NOTES
Subjective:  HPI                    Objective:  System    Physical Exam    General     ROS    Assessment/Plan:    DISCHARGE REPORT    Progress reporting period is from 7/3/2019 to 7/30/2019.      SUBJECTIVE  Subjective changes noted by patient:   Patient reports that she is about 70-80% back to normal.  HEP are going well.  Patient states that when she stands for over an hour she asked to perform standing exercises for pain relief.  Patient feels that she can walk longer distances and do more throughout the day with less pain.  Current pain level is .       Previous pain level was:   Initial Pain level: (tightness and goes to a 4/10)   Changes in function:  Yes (See Goal flowsheet attached for changes in current functional level)     Adverse reaction to treatment or activity: None     OBJECTIVE  Changes noted in objective findings:  Yes, patient has demonstrated understanding via  progression of stretches and strengthening exercises.    Patient's flexibility has improved.  Patient has a good home exercise program.  Low back flexibility is within normal limits.  Patient is able to perform plank exercise for 1 minute with good form.

## 2019-08-28 ENCOUNTER — PATIENT OUTREACH (OUTPATIENT)
Dept: GERIATRIC MEDICINE | Facility: CLINIC | Age: 74
End: 2019-08-28

## 2019-08-28 NOTE — PROGRESS NOTES
[facility] Care Coordination Contact    Called spouse Aaliyah to schedule annual HRA home visit. HRA has been scheduled for September 9th at 9:30 am.  Called Yun Ashford and scheduled an  for the home visit.   RENA Bolton, [facility] Care Coordinator  Tel [phone]  Fax [phone]

## 2019-08-29 ENCOUNTER — PATIENT OUTREACH (OUTPATIENT)
Dept: GERIATRIC MEDICINE | Facility: CLINIC | Age: 74
End: 2019-08-29

## 2019-09-02 NOTE — PROGRESS NOTES
[facility] Care Coordination Contact    Received call from Meena Boudreaux , stating that member called her to say that she and her  moved to [place] to live with daughter.  She said the move took place in early August.  Care Coordinator  scheduled annual HRA with her  via phone  yesterday but  Phone  may have not been relaying accurate information.  They do not plan to return to [place].  She said that her son who lives in the [facility] would be closing MA.   Completed and faxed [phone] to [facility] to close MA as [facility] shows it is still open.  .  POC updated.  Care Coordinator will wait from member to close with [facility].  RENA Bolton, [facility] Care Coordinator  Tel [phone]  Fax [phone]

## 2019-09-26 ENCOUNTER — DOCUMENTATION ONLY (OUTPATIENT)
Dept: LAB | Facility: CLINIC | Age: 74
End: 2019-09-26

## 2019-09-26 DIAGNOSIS — Z12.11 COLON CANCER SCREENING: Primary | ICD-10-CM

## 2019-09-26 NOTE — PROGRESS NOTES
[facility] is doing some FIT testing management. They have requested an order for this patient. If this is something you would like for them to do, please sign the pended future order. If not, please let me know and I will forward the message on.    Thanks!  Radha Herndon  [facility]

## 2019-10-30 NOTE — PROGRESS NOTES
[facility] Care Coordination Contact    [facility] indicates that client closed to [facility] on 9/30/19.  Care Coordinator talked to financial worker approx a month ago and was told MA is closed.  [facility] indicates MA is open with no prepaid health plan but indicates [place] address. Care Coordinator closing member.  Previously, she was rate cell A with no services.  RENA Bolton, [facility] Care Coordinator  Tel [phone]  Fax [phone]

## 2021-06-29 NOTE — PROGRESS NOTES
Subjective:  HPI                    Objective:  System    Physical Exam    General     ROS    Assessment/Plan:    ASSESSMENT/PLAN  Updated problem list and treatment plan:   STG/LTGs have been met:  Yes (See Goal flow sheet completed today.)  Progress toward STG/LTGs have been made:  Yes (See Goal flow sheet completed today.)  Assessment of Progress: The patient's condition is improving.  Self Management Plans:  Patient is independent in a home treatment program.  Patient is independent in self management of symptoms.    Young Devin continues to require the following intervention to meet STG and LT's:  PT intervention is no longer required to meet STG/LTG.    Recommendations:  This patient is ready to be discharged from therapy and continue their home treatment program.    Please refer to the daily flowsheet for treatment today, total treatment time and time spent performing 1:1 timed codes.                     KEATON MCGHEE 34y Male  MRN#: [id number]   Hospital Day: 1d    HPI:  35 yo M with PMH active IVDU Heroin, Methamphetamine, LSD, Anxiety, Depression who presents to the ED via EMS after having a "bad trip" with LSD, meth, and Heroin use 6 hours prior to arrival. Patient was brought into ED, after girlfriend activated EMS. He reports that he took LSD around 11 pm, followed by heroin around 12 am, and roughly about an hour later he started feeling extremely anxious and with an outer body experience. He states that this has never happened to him before and his gf noted him to be more erratic, breathing heavily and repeating "I feel like im going to die, my hands are falling off. He was evaluated in the ED, found to have b/l UE track marks with arm swelling, erythema, and tenderness. Upon interview, patient is back to baseline mental status, AAOx3, and states that he took drugs for recreational use and denies any suicidal ideations. He endorses that his b/l arm swelling started a week ago after he returned from trip to [place], where he admits to sharing needles. He endorses acute muscle tenderness b/l arms R>L, rated 7/10 in severity and described as sharp and constant,  but otherwise denies fevers or chills.  He states that he gets his drugs from the same dealer and does not believe it was laced with anything. He otherwise denies any chest pain, sob, nausea, vomiting, dizziness or abdominal discomfort.     Ed course: BP: 128/78, HR: 100, T: 98.8, RR: 16, SPO2 98%. EKG with no ischemic changes, NSR, no leukocytosis, no electrolyte imbalances. Lactate 1.3, s/p bedside Upper extremity US in ED with + cobblestoning, agreeable for inpatient detox (28 Jun 2021 09:19)      SUBJECTIVE  Patient is a 34y old Male who presents with a chief complaint of B/L Upper Extremity Cellulitis vs Lymphangitis  R/o necrotizing fascitis (28 Jun 2021 16:15)  Currently admitted to medicine with the primary diagnosis of Cellulitis      INTERVAL HPI AND OVERNIGHT EVENTS:  Patient was examined and seen at bedside. This morning he is resting comfortably in bed and reports no issues or overnight events.    REVIEW OF SYMPTOMS:  CONSTITUTIONAL: No weakness, fevers or chills; No headaches  EYES: No visual changes, eye pain, or discharge  ENT: No vertigo; No ear pain or change in hearing; No sore throat or difficulty swallowing  NECK: No pain or stiffness  RESPIRATORY: No cough, wheezing, or hemoptysis; No shortness of breath  CARDIOVASCULAR: No chest pain or palpitations  GASTROINTESTINAL: No abdominal or epigastric pain; No nausea, vomiting, or hematemesis; No diarrhea or constipation; No melena or hematochezia  GENITOURINARY: No dysuria, frequency or hematuria  MUSCULOSKELETAL: No joint pain, no muscle pain, no weakness  NEUROLOGICAL: No numbness or weakness  SKIN: No itching or rashes    OBJECTIVE  PAST MEDICAL & SURGICAL HISTORY  Substance abuse    No significant past surgical history      ALLERGIES:  No Known Allergies    MEDICATIONS:  STANDING MEDICATIONS  ampicillin/sulbactam  IVPB 1.5 Gram(s) IV Intermittent every 6 hours  enoxaparin Injectable 40 milliGRAM(s) SubCutaneous at bedtime  famotidine Injectable 20 milliGRAM(s) IV Push once  methadone    Tablet 15 milliGRAM(s) Oral every 12 hours  vancomycin  IVPB 1000 milliGRAM(s) IV Intermittent every 12 hours    PRN MEDICATIONS  acetaminophen   Tablet .. 650 milliGRAM(s) Oral every 6 hours PRN  loperamide 2 milliGRAM(s) Oral every 6 hours PRN  LORazepam   Injectable 2 milliGRAM(s) IV Push every 4 hours PRN  ondansetron Injectable 4 milliGRAM(s) IV Push every 6 hours PRN  prochlorperazine   Tablet 10 milliGRAM(s) Oral every 8 hours PRN      VITAL SIGNS: Last 24 Hours  T(C): 36.4 (29 Jun 2021 05:00), Max: 36.4 (28 Jun 2021 20:10)  T(F): 97.5 (29 Jun 2021 05:00), Max: 97.5 (28 Jun 2021 20:10)  HR: 74 (29 Jun 2021 05:00) (74 - 83)  BP: 105/59 (29 Jun 2021 05:00) (105/59 - 125/70)  BP(mean): --  RR: 17 (29 Jun 2021 05:00) (17 - 18)  SpO2: --    LABS:                        11.4   5.32  )-----------( 234      ( 29 Jun 2021 05:42 )             35.1     06-28    139  |  101  |  13  ----------------------------<  105<H>  5.3<H>   |  29  |  0.7    Ca    9.5      28 Jun 2021 06:00    TPro  7.8  /  Alb  4.4  /  TBili  0.3  /  DBili  x   /  AST  29  /  ALT  15  /  AlkPhos  101  06-28  Sedimentation Rate, Erythrocyte: 42 mm/Hr *H* (06-28-21 @ 17:14)    RADIOLOGY:  Xray Forearm, Bilateral 6/28/21  IMPRESSION:No evidence of acute osseous abnormality or radiopaque foreign body of bilateral forearms. No evidence of subcutaneous emphysema.    PHYSICAL EXAM:  CONSTITUTIONAL: No acute distress, well-developed, well-groomed, sleeping in bed.  PULMONARY: Clear to auscultation bilaterally; no wheezes, rales, or rhonchi  CARDIOVASCULAR: Regular rate and rhythm; no murmurs, rubs, or gallops  GASTROINTESTINAL: Soft, non-tender, non-distended; bowel sounds present  MUSCULOSKELETAL: 2+ peripheral pulses; no cyanosis  NEUROLOGY: moving all extremities  SKIN: No rashes or lesions; warm and dry  Ex: on taking off the patient socks, it was noted that patient had empty heroin bags on him which were disposed of. Apparent track renetta on patient left foot. Noticeable track marks and swelling of  on b/l upper extremities         KEATON MCGHEE 34y Male  MRN#: [id number]   Hospital Day: 1d    HPI:  33 yo M with PMH active IVDU Heroin, Methamphetamine, LSD, Anxiety, Depression who presents to the ED via EMS after having a "bad trip" with LSD, meth, and Heroin use 6 hours prior to arrival. Patient was brought into ED, after girlfriend activated EMS. He reports that he took LSD around 11 pm, followed by heroin around 12 am, and roughly about an hour later he started feeling extremely anxious and with an outer body experience. He states that this has never happened to him before and his gf noted him to be more erratic, breathing heavily and repeating "I feel like im going to die, my hands are falling off. He was evaluated in the ED, found to have b/l UE track marks with arm swelling, erythema, and tenderness. Upon interview, patient is back to baseline mental status, AAOx3, and states that he took drugs for recreational use and denies any suicidal ideations. He endorses that his b/l arm swelling started a week ago after he returned from trip to [place], where he admits to sharing needles. He endorses acute muscle tenderness b/l arms R>L, rated 7/10 in severity and described as sharp and constant,  but otherwise denies fevers or chills.  He states that he gets his drugs from the same dealer and does not believe it was laced with anything. He otherwise denies any chest pain, sob, nausea, vomiting, dizziness or abdominal discomfort.     Ed course: BP: 128/78, HR: 100, T: 98.8, RR: 16, SPO2 98%. EKG with no ischemic changes, NSR, no leukocytosis, no electrolyte imbalances. Lactate 1.3, s/p bedside Upper extremity US in ED with + cobblestoning, agreeable for inpatient detox (28 Jun 2021 09:19)      SUBJECTIVE  Patient is a 34y old Male who presents with a chief complaint of B/L Upper Extremity Cellulitis vs Lymphangitis  R/o necrotizing fascitis (28 Jun 2021 16:15)  Currently admitted to medicine with the primary diagnosis of Cellulitis  + walking to [place] with no issues, +drinking, + eating, last BM last night was normal, +peeing.  denies f/c/n/v   denies SOB  denies lightheadedness, dizziness, and syncope.  The patient states he has not used drugs during this hospitalization when asked about the heroin bags i found in his socks during bedside exam.      INTERVAL HPI AND OVERNIGHT EVENTS:  Patient was examined and seen at bedside. This morning he is resting comfortably in bed and reports no issues or overnight events.    OBJECTIVE  PAST MEDICAL & SURGICAL HISTORY  Substance abuse    No significant past surgical history      ALLERGIES:  No Known Allergies    MEDICATIONS:  STANDING MEDICATIONS  ampicillin/sulbactam  IVPB 1.5 Gram(s) IV Intermittent every 6 hours  enoxaparin Injectable 40 milliGRAM(s) SubCutaneous at bedtime  famotidine Injectable 20 milliGRAM(s) IV Push once  methadone    Tablet 15 milliGRAM(s) Oral every 12 hours  vancomycin  IVPB 1000 milliGRAM(s) IV Intermittent every 12 hours    PRN MEDICATIONS  acetaminophen   Tablet .. 650 milliGRAM(s) Oral every 6 hours PRN  loperamide 2 milliGRAM(s) Oral every 6 hours PRN  LORazepam   Injectable 2 milliGRAM(s) IV Push every 4 hours PRN  ondansetron Injectable 4 milliGRAM(s) IV Push every 6 hours PRN  prochlorperazine   Tablet 10 milliGRAM(s) Oral every 8 hours PRN      VITAL SIGNS: Last 24 Hours  T(C): 36.4 (29 Jun 2021 05:00), Max: 36.4 (28 Jun 2021 20:10)  T(F): 97.5 (29 Jun 2021 05:00), Max: 97.5 (28 Jun 2021 20:10)  HR: 74 (29 Jun 2021 05:00) (74 - 83)  BP: 105/59 (29 Jun 2021 05:00) (105/59 - 125/70)  BP(mean): --  RR: 17 (29 Jun 2021 05:00) (17 - 18)  SpO2: --    LABS:                        11.4   5.32  )-----------( 234      ( 29 Jun 2021 05:42 )             35.1     06-28    139  |  101  |  13  ----------------------------<  105<H>  5.3<H>   |  29  |  0.7    Ca    9.5      28 Jun 2021 06:00    TPro  7.8  /  Alb  4.4  /  TBili  0.3  /  DBili  x   /  AST  29  /  ALT  15  /  AlkPhos  101  06-28  Sedimentation Rate, Erythrocyte: 42 mm/Hr *H* (06-28-21 @ 17:14)    RADIOLOGY:  Xray Forearm, Bilateral 6/28/21  IMPRESSION: No evidence of acute osseous abnormality or radiopaque foreign body of bilateral forearms. No evidence of subcutaneous emphysema.      PHYSICAL EXAM:  CONSTITUTIONAL: No acute distress, well-developed, well-groomed, sleeping in bed.  PULMONARY: Clear to auscultation bilaterally; no wheezes, rales, or rhonchi  CARDIOVASCULAR: Regular rate and rhythm; no murmurs, rubs, or gallops  GASTROINTESTINAL: Soft, non-tender, non-distended; bowel sounds present  MUSCULOSKELETAL: 2+ peripheral pulses; no cyanosis  NEUROLOGY: moving all extremities  SKIN: No rashes or lesions; warm and dry  Ex: on taking off the patient socks, it was noted that patient had empty heroin bags on him which were disposed of in room garbage. Apparent track renetta on patient left foot. Noticeable track marks and swelling of  on b/l upper extremities with noticeable erythema b/l. The patient has a LUE IV which appears intact.         KEATON MCGHEE 34y Male  MRN#: [id number]   Hospital Day: 1d    HPI:  33 yo M with PMH active IVDU Heroin, Methamphetamine, LSD, Anxiety, Depression who presents to the ED via EMS after having a "bad trip" with LSD, meth, and Heroin use 6 hours prior to arrival. Patient was brought into ED, after girlfriend activated EMS. He reports that he took LSD around 11 pm, followed by heroin around 12 am, and roughly about an hour later he started feeling extremely anxious and with an outer body experience. He states that this has never happened to him before and his gf noted him to be more erratic, breathing heavily and repeating "I feel like im going to die, my hands are falling off. He was evaluated in the ED, found to have b/l UE track marks with arm swelling, erythema, and tenderness. Upon interview, patient is back to baseline mental status, AAOx3, and states that he took drugs for recreational use and denies any suicidal ideations. He endorses that his b/l arm swelling started a week ago after he returned from trip to [place], where he admits to sharing needles. He endorses acute muscle tenderness b/l arms R>L, rated 7/10 in severity and described as sharp and constant,  but otherwise denies fevers or chills.  He states that he gets his drugs from the same dealer and does not believe it was laced with anything. He otherwise denies any chest pain, sob, nausea, vomiting, dizziness or abdominal discomfort.     Ed course: BP: 128/78, HR: 100, T: 98.8, RR: 16, SPO2 98%. EKG with no ischemic changes, NSR, no leukocytosis, no electrolyte imbalances. Lactate 1.3, s/p bedside Upper extremity US in ED with + cobblestoning, agreeable for inpatient detox (28 Jun 2021 09:19)      SUBJECTIVE  Patient is a 34y old Male who presents with a chief complaint of B/L Upper Extremity Cellulitis vs Lymphangitis  R/o necrotizing fascitis (28 Jun 2021 16:15)  Currently admitted to medicine with the primary diagnosis of Cellulitis  + walking to [place] with no issues, +drinking, + eating, last BM last night was normal, +peeing.  denies f/c/n/v   denies SOB  denies lightheadedness, dizziness, and syncope.  The patient states he has not used drugs during this hospitalization when asked about the heroin bags i found in his socks during bedside exam.      INTERVAL HPI AND OVERNIGHT EVENTS:  Patient was examined and seen at bedside. This morning he is resting comfortably in bed and reports no issues or overnight events.    OBJECTIVE  PAST MEDICAL & SURGICAL HISTORY  Substance abuse    No significant past surgical history      ALLERGIES:  No Known Allergies    MEDICATIONS:  STANDING MEDICATIONS  ampicillin/sulbactam  IVPB 1.5 Gram(s) IV Intermittent every 6 hours  enoxaparin Injectable 40 milliGRAM(s) SubCutaneous at bedtime  famotidine Injectable 20 milliGRAM(s) IV Push once  methadone    Tablet 15 milliGRAM(s) Oral every 12 hours  vancomycin  IVPB 1000 milliGRAM(s) IV Intermittent every 12 hours    PRN MEDICATIONS  acetaminophen   Tablet .. 650 milliGRAM(s) Oral every 6 hours PRN  loperamide 2 milliGRAM(s) Oral every 6 hours PRN  LORazepam   Injectable 2 milliGRAM(s) IV Push every 4 hours PRN  ondansetron Injectable 4 milliGRAM(s) IV Push every 6 hours PRN  prochlorperazine   Tablet 10 milliGRAM(s) Oral every 8 hours PRN      VITAL SIGNS: Last 24 Hours  T(C): 36.4 (29 Jun 2021 05:00), Max: 36.4 (28 Jun 2021 20:10)  T(F): 97.5 (29 Jun 2021 05:00), Max: 97.5 (28 Jun 2021 20:10)  HR: 74 (29 Jun 2021 05:00) (74 - 83)  BP: 105/59 (29 Jun 2021 05:00) (105/59 - 125/70)  BP(mean): --  RR: 17 (29 Jun 2021 05:00) (17 - 18)  SpO2: --    LABS:                        11.4   5.32  )-----------( 234      ( 29 Jun 2021 05:42 )             35.1     06-28    139  |  101  |  13  ----------------------------<  105<H>  5.3<H>   |  29  |  0.7    Ca    9.5      28 Jun 2021 06:00    TPro  7.8  /  Alb  4.4  /  TBili  0.3  /  DBili  x   /  AST  29  /  ALT  15  /  AlkPhos  101  06-28  Sedimentation Rate, Erythrocyte: 42 mm/Hr *H* (06-28-21 @ 17:14)    RADIOLOGY:  Xray Forearm, Bilateral 6/28/21  IMPRESSION: No evidence of acute osseous abnormality or radiopaque foreign body of bilateral forearms. No evidence of subcutaneous emphysema.      PHYSICAL EXAM:  CONSTITUTIONAL: No acute distress, well-developed, well-groomed, sleeping in bed. Upon awakening, the patient seem extremely calm and possibly high from inpt herion use (see Ex: exam)  PULMONARY: Clear to auscultation bilaterally; no wheezes, rales, or rhonchi  CARDIOVASCULAR: Regular rate and rhythm; no murmurs, rubs, or gallops  GASTROINTESTINAL: Soft, non-tender, non-distended; bowel sounds present  MUSCULOSKELETAL: 2+ peripheral pulses; no cyanosis  NEUROLOGY: moving all extremities  SKIN: No rashes or lesions; warm and dry  Ex: on taking off the patient socks, it was noted that patient had empty heroin bags on him which were disposed of in room garbage. Apparent track renetta on patient left foot. Noticeable track marks and swelling of  on b/l upper extremities with noticeable erythema b/l. The patient has a LUE IV which appears intact.         KEATON MCGHEE 34y Male  MRN#: [id number]   Hospital Day: 1d    HPI:  35 yo M with PMH active IVDU Heroin, Methamphetamine, LSD, Anxiety, Depression who presents to the ED via EMS after having a "bad trip" with LSD, meth, and Heroin use 6 hours prior to arrival. Patient was brought into ED, after girlfriend activated EMS. He reports that he took LSD around 11 pm, followed by heroin around 12 am, and roughly about an hour later he started feeling extremely anxious and with an outer body experience. He states that this has never happened to him before and his gf noted him to be more erratic, breathing heavily and repeating "I feel like im going to die, my hands are falling off. He was evaluated in the ED, found to have b/l UE track marks with arm swelling, erythema, and tenderness. Upon interview, patient is back to baseline mental status, AAOx3, and states that he took drugs for recreational use and denies any suicidal ideations. He endorses that his b/l arm swelling started a week ago after he returned from trip to [place], where he admits to sharing needles. He endorses acute muscle tenderness b/l arms R>L, rated 7/10 in severity and described as sharp and constant,  but otherwise denies fevers or chills.  He states that he gets his drugs from the same dealer and does not believe it was laced with anything. He otherwise denies any chest pain, sob, nausea, vomiting, dizziness or abdominal discomfort.     Ed course: BP: 128/78, HR: 100, T: 98.8, RR: 16, SPO2 98%. EKG with no ischemic changes, NSR, no leukocytosis, no electrolyte imbalances. Lactate 1.3, s/p bedside Upper extremity US in ED with + cobblestoning, agreeable for inpatient detox (28 Jun 2021 09:19)      SUBJECTIVE  Patient is a 34y old Male who presents with a chief complaint of B/L Upper Extremity Cellulitis vs Lymphangitis  R/o necrotizing fascitis (28 Jun 2021 16:15)  Currently admitted to medicine with the primary diagnosis of Cellulitis  + walking to [place] with no issues, +drinking, + eating, last BM last night was normal, +peeing.  denies f/c/n/v   denies SOB  denies lightheadedness, dizziness, and syncope.  The patient states he has not used drugs during this hospitalization when asked about the heroin bags i found in his socks during bedside exam.      INTERVAL HPI AND OVERNIGHT EVENTS:  Patient was examined and seen at bedside. This morning he is resting comfortably in bed and reports no issues or overnight events.    OBJECTIVE  PAST MEDICAL & SURGICAL HISTORY  Substance abuse    No significant past surgical history      ALLERGIES:  No Known Allergies    MEDICATIONS:  STANDING MEDICATIONS  ampicillin/sulbactam  IVPB 1.5 Gram(s) IV Intermittent every 6 hours  enoxaparin Injectable 40 milliGRAM(s) SubCutaneous at bedtime  famotidine Injectable 20 milliGRAM(s) IV Push once  methadone    Tablet 15 milliGRAM(s) Oral every 12 hours  vancomycin  IVPB 1000 milliGRAM(s) IV Intermittent every 12 hours    PRN MEDICATIONS  acetaminophen   Tablet .. 650 milliGRAM(s) Oral every 6 hours PRN  loperamide 2 milliGRAM(s) Oral every 6 hours PRN  LORazepam   Injectable 2 milliGRAM(s) IV Push every 4 hours PRN  ondansetron Injectable 4 milliGRAM(s) IV Push every 6 hours PRN  prochlorperazine   Tablet 10 milliGRAM(s) Oral every 8 hours PRN      VITAL SIGNS: Last 24 Hours  T(C): 36.4 (29 Jun 2021 05:00), Max: 36.4 (28 Jun 2021 20:10)  T(F): 97.5 (29 Jun 2021 05:00), Max: 97.5 (28 Jun 2021 20:10)  HR: 74 (29 Jun 2021 05:00) (74 - 83)  BP: 105/59 (29 Jun 2021 05:00) (105/59 - 125/70)  BP(mean): --  RR: 17 (29 Jun 2021 05:00) (17 - 18)  SpO2: --    LABS:                        11.4   5.32  )-----------( 234      ( 29 Jun 2021 05:42 )             35.1     06-28    139  |  101  |  13  ----------------------------<  105<H>  5.3<H>   |  29  |  0.7    Ca    9.5      28 Jun 2021 06:00    TPro  7.8  /  Alb  4.4  /  TBili  0.3  /  DBili  x   /  AST  29  /  ALT  15  /  AlkPhos  101  06-28  Sedimentation Rate, Erythrocyte: 42 mm/Hr *H* (06-28-21 @ 17:14)    RADIOLOGY:  Xray Forearm, Bilateral 6/28/21  IMPRESSION: No evidence of acute osseous abnormality or radiopaque foreign body of bilateral forearms. No evidence of subcutaneous emphysema.      PHYSICAL EXAM:  CONSTITUTIONAL: No acute distress, well-developed, well-groomed, sleeping in bed. Upon awakening, the patient seem extremely calm and possibly high from inpt herion use (see Ex: exam)  PULMONARY: Clear to auscultation bilaterally; no wheezes, rales, or rhonchi  CARDIOVASCULAR: Regular rate and rhythm; no murmurs, rubs, or gallops  GASTROINTESTINAL: Soft, non-tender, non-distended; bowel sounds present  MUSCULOSKELETAL: 2+ peripheral pulses; no cyanosis  NEUROLOGY: moving all extremities  SKIN: No rashes or lesions; warm and dry  Ex: on taking off the patient socks, it was noted that patient had empty + partially full heroin bags on him which were turned over to hospital security. Apparent track renetta on patient left foot. Noticeable track marks and swelling of  on b/l upper extremities with noticeable erythema b/l. The patient has a LUE IV which appears intact.